# Patient Record
Sex: FEMALE | Race: WHITE | Employment: OTHER | URBAN - NONMETROPOLITAN AREA
[De-identification: names, ages, dates, MRNs, and addresses within clinical notes are randomized per-mention and may not be internally consistent; named-entity substitution may affect disease eponyms.]

---

## 2021-01-01 ENCOUNTER — APPOINTMENT (OUTPATIENT)
Dept: GENERAL RADIOLOGY | Age: 76
DRG: 871 | End: 2021-01-01
Attending: INTERNAL MEDICINE
Payer: MEDICARE

## 2021-01-01 ENCOUNTER — HOSPITAL ENCOUNTER (INPATIENT)
Age: 76
LOS: 2 days | Discharge: HOSPICE | DRG: 871 | End: 2021-03-04
Attending: INTERNAL MEDICINE | Admitting: HOSPITALIST
Payer: MEDICARE

## 2021-01-01 ENCOUNTER — ANESTHESIA EVENT (OUTPATIENT)
Dept: ENDOSCOPY | Age: 76
DRG: 871 | End: 2021-01-01
Payer: MEDICARE

## 2021-01-01 ENCOUNTER — ANESTHESIA (OUTPATIENT)
Dept: ENDOSCOPY | Age: 76
DRG: 871 | End: 2021-01-01
Payer: MEDICARE

## 2021-01-01 VITALS
RESPIRATION RATE: 15 BRPM | OXYGEN SATURATION: 85 % | DIASTOLIC BLOOD PRESSURE: 43 MMHG | SYSTOLIC BLOOD PRESSURE: 81 MMHG

## 2021-01-01 VITALS
OXYGEN SATURATION: 89 % | SYSTOLIC BLOOD PRESSURE: 126 MMHG | HEIGHT: 60 IN | BODY MASS INDEX: 23.56 KG/M2 | RESPIRATION RATE: 20 BRPM | HEART RATE: 95 BPM | DIASTOLIC BLOOD PRESSURE: 46 MMHG | WEIGHT: 120 LBS | TEMPERATURE: 103.8 F

## 2021-01-01 LAB
ALBUMIN SERPL-MCNC: 2.8 G/DL (ref 3.5–5.2)
ALBUMIN SERPL-MCNC: 2.8 G/DL (ref 3.5–5.2)
ALBUMIN SERPL-MCNC: 3 G/DL (ref 3.5–5.2)
ALP BLD-CCNC: 112 U/L (ref 35–104)
ALP BLD-CCNC: 124 U/L (ref 35–104)
ALP BLD-CCNC: 186 U/L (ref 35–104)
ALT SERPL-CCNC: 360 U/L (ref 5–33)
ALT SERPL-CCNC: 418 U/L (ref 5–33)
ALT SERPL-CCNC: 488 U/L (ref 5–33)
ANION GAP SERPL CALCULATED.3IONS-SCNC: 14 MMOL/L (ref 7–19)
ANION GAP SERPL CALCULATED.3IONS-SCNC: 17 MMOL/L (ref 7–19)
AST SERPL-CCNC: 279 U/L (ref 5–32)
AST SERPL-CCNC: 413 U/L (ref 5–32)
AST SERPL-CCNC: 571 U/L (ref 5–32)
ATYPICAL LYMPHOCYTE RELATIVE PERCENT: 1 % (ref 0–8)
BACTERIA: ABNORMAL /HPF
BANDED NEUTROPHILS RELATIVE PERCENT: 15 % (ref 0–5)
BANDED NEUTROPHILS RELATIVE PERCENT: 20 % (ref 0–5)
BASE EXCESS ARTERIAL: -5.1 MMOL/L (ref -2–2)
BASE EXCESS ARTERIAL: -6.3 MMOL/L (ref -2–2)
BASE EXCESS VENOUS: -4 MMOL/L
BASE EXCESS VENOUS: -4 MMOL/L
BASE EXCESS VENOUS: -5 MMOL/L
BASE EXCESS VENOUS: -6 MMOL/L
BASE EXCESS VENOUS: -8 MMOL/L
BASOPHILS ABSOLUTE: 0 K/UL (ref 0–0.2)
BASOPHILS ABSOLUTE: 0 K/UL (ref 0–0.2)
BASOPHILS RELATIVE PERCENT: 0 % (ref 0–1)
BASOPHILS RELATIVE PERCENT: 0 % (ref 0–1)
BILIRUB SERPL-MCNC: 2.2 MG/DL (ref 0.2–1.2)
BILIRUB SERPL-MCNC: 2.4 MG/DL (ref 0.2–1.2)
BILIRUB SERPL-MCNC: 3 MG/DL (ref 0.2–1.2)
BILIRUBIN URINE: ABNORMAL
BLOOD, URINE: NEGATIVE
BUN BLDV-MCNC: 34 MG/DL (ref 8–23)
BUN BLDV-MCNC: 37 MG/DL (ref 8–23)
BUN BLDV-MCNC: 45 MG/DL (ref 8–23)
BUN BLDV-MCNC: 48 MG/DL (ref 8–23)
CALCIUM SERPL-MCNC: 7.3 MG/DL (ref 8.8–10.2)
CALCIUM SERPL-MCNC: 7.3 MG/DL (ref 8.8–10.2)
CALCIUM SERPL-MCNC: 8 MG/DL (ref 8.8–10.2)
CALCIUM SERPL-MCNC: 8.9 MG/DL (ref 8.8–10.2)
CARBOXYHEMOGLOBIN ARTERIAL: 0.4 % (ref 0–5)
CARBOXYHEMOGLOBIN ARTERIAL: 2.4 % (ref 0–5)
CARBOXYHEMOGLOBIN: 1 %
CARBOXYHEMOGLOBIN: 1.3 %
CARBOXYHEMOGLOBIN: 2.4 %
CHLORIDE BLD-SCNC: 101 MMOL/L (ref 98–111)
CHLORIDE BLD-SCNC: 103 MMOL/L (ref 98–111)
CHLORIDE BLD-SCNC: 97 MMOL/L (ref 98–111)
CHLORIDE BLD-SCNC: 99 MMOL/L (ref 98–111)
CLARITY: ABNORMAL
CO2: 16 MMOL/L (ref 22–29)
CO2: 19 MMOL/L (ref 22–29)
CO2: 20 MMOL/L (ref 22–29)
CO2: 22 MMOL/L (ref 22–29)
COLOR: ABNORMAL
CREAT SERPL-MCNC: 1.4 MG/DL (ref 0.5–0.9)
CREAT SERPL-MCNC: 1.8 MG/DL (ref 0.5–0.9)
CREAT SERPL-MCNC: 2.3 MG/DL (ref 0.5–0.9)
CREAT SERPL-MCNC: 2.4 MG/DL (ref 0.5–0.9)
CREATININE URINE: 86.3 MG/DL (ref 4.2–622)
EKG P AXIS: 80 DEGREES
EKG P-R INTERVAL: 178 MS
EKG Q-T INTERVAL: 368 MS
EKG QRS DURATION: 78 MS
EKG QTC CALCULATION (BAZETT): 410 MS
EKG T AXIS: 120 DEGREES
EOSINOPHIL,URINE: NORMAL
EOSINOPHILS ABSOLUTE: 0 K/UL (ref 0–0.6)
EOSINOPHILS ABSOLUTE: 0 K/UL (ref 0–0.6)
EOSINOPHILS RELATIVE PERCENT: 0 % (ref 0–5)
EOSINOPHILS RELATIVE PERCENT: 0 % (ref 0–5)
EPITHELIAL CELLS, UA: ABNORMAL /HPF
FOLATE: 17.6 NG/ML (ref 4.8–37.3)
GFR AFRICAN AMERICAN: 24
GFR AFRICAN AMERICAN: 25
GFR AFRICAN AMERICAN: 33
GFR AFRICAN AMERICAN: 44
GFR NON-AFRICAN AMERICAN: 20
GFR NON-AFRICAN AMERICAN: 21
GFR NON-AFRICAN AMERICAN: 27
GFR NON-AFRICAN AMERICAN: 36
GLUCOSE BLD-MCNC: 68 MG/DL (ref 74–109)
GLUCOSE BLD-MCNC: 73 MG/DL (ref 74–109)
GLUCOSE BLD-MCNC: 78 MG/DL (ref 74–109)
GLUCOSE BLD-MCNC: 79 MG/DL (ref 74–109)
GLUCOSE URINE: NEGATIVE MG/DL
HCO3 ARTERIAL: 19 MMOL/L (ref 22–26)
HCO3 ARTERIAL: 24.2 MMOL/L (ref 22–26)
HCO3 VENOUS: 19 MMOL/L (ref 23–29)
HCO3 VENOUS: 19 MMOL/L (ref 23–29)
HCO3 VENOUS: 22 MMOL/L (ref 23–29)
HCO3 VENOUS: 23 MMOL/L (ref 23–29)
HCO3 VENOUS: 23 MMOL/L (ref 23–29)
HCT VFR BLD CALC: 27.7 % (ref 37–47)
HCT VFR BLD CALC: 30.8 % (ref 37–47)
HCT VFR BLD CALC: 31.8 % (ref 37–47)
HEMOGLOBIN, ART, EXTENDED: 10 G/DL (ref 12–16)
HEMOGLOBIN, ART, EXTENDED: 10.1 G/DL (ref 12–16)
HEMOGLOBIN: 10 G/DL (ref 12–16)
HEMOGLOBIN: 9 G/DL (ref 12–16)
HEMOGLOBIN: 9.9 G/DL (ref 12–16)
IMMATURE GRANULOCYTES #: 0.2 K/UL
IMMATURE GRANULOCYTES #: 2 K/UL
INR BLD: 1.59 (ref 0.88–1.18)
INR BLD: 2.04 (ref 0.88–1.18)
KETONES, URINE: ABNORMAL MG/DL
LACTIC ACID: 2.2 MMOL/L (ref 0.5–1.9)
LACTIC ACID: 2.2 MMOL/L (ref 0.5–1.9)
LACTIC ACID: 3.5 MMOL/L (ref 0.5–1.9)
LEUKOCYTE ESTERASE, URINE: ABNORMAL
LIPASE: 255 U/L (ref 13–60)
LYMPHOCYTES ABSOLUTE: 0.5 K/UL (ref 1.1–4.5)
LYMPHOCYTES ABSOLUTE: 0.7 K/UL (ref 1.1–4.5)
LYMPHOCYTES RELATIVE PERCENT: 4 % (ref 20–40)
LYMPHOCYTES RELATIVE PERCENT: 5 % (ref 20–40)
MACROCYTES: ABNORMAL
MAGNESIUM: 1.7 MG/DL (ref 1.6–2.4)
MCH RBC QN AUTO: 33.6 PG (ref 27–31)
MCH RBC QN AUTO: 34.1 PG (ref 27–31)
MCH RBC QN AUTO: 34.2 PG (ref 27–31)
MCHC RBC AUTO-ENTMCNC: 31.1 G/DL (ref 33–37)
MCHC RBC AUTO-ENTMCNC: 32.5 G/DL (ref 33–37)
MCHC RBC AUTO-ENTMCNC: 32.5 G/DL (ref 33–37)
MCV RBC AUTO: 105.1 FL (ref 81–99)
MCV RBC AUTO: 105.3 FL (ref 81–99)
MCV RBC AUTO: 107.8 FL (ref 81–99)
METAMYELOCYTES RELATIVE PERCENT: 6 %
METHEMOGLOBIN ARTERIAL: 0.6 %
METHEMOGLOBIN ARTERIAL: 0.8 %
METHEMOGLOBIN VENOUS: 0.8 %
METHEMOGLOBIN VENOUS: 0.8 %
MONOCYTES ABSOLUTE: 0 K/UL (ref 0–0.9)
MONOCYTES ABSOLUTE: 0.1 K/UL (ref 0–0.9)
MONOCYTES RELATIVE PERCENT: 0 % (ref 0–10)
MONOCYTES RELATIVE PERCENT: 1 % (ref 0–10)
MYELOCYTE PERCENT: 7 %
NEUTROPHILS ABSOLUTE: 10.4 K/UL (ref 1.5–7.5)
NEUTROPHILS ABSOLUTE: 12.9 K/UL (ref 1.5–7.5)
NEUTROPHILS RELATIVE PERCENT: 66 % (ref 50–65)
NEUTROPHILS RELATIVE PERCENT: 75 % (ref 50–65)
NITRITE, URINE: NEGATIVE
O2 CONTENT ARTERIAL: 12.9 ML/DL
O2 CONTENT ARTERIAL: 14.2 ML/DL
O2 CONTENT, VEN: 10 ML/DL
O2 CONTENT, VEN: 13 ML/DL
O2 CONTENT, VEN: 4 ML/DL
O2 CONTENT, VEN: 8 ML/DL
O2 CONTENT, VEN: 8 ML/DL
O2 SAT, ARTERIAL: 91.5 %
O2 SAT, ARTERIAL: 96.1 %
O2 SAT, VEN: 24 %
O2 SAT, VEN: 64 %
O2 SAT, VEN: 65 %
O2 SAT, VEN: 75 %
O2 SAT, VEN: 92 %
O2 THERAPY: ABNORMAL
O2 THERAPY: ABNORMAL
PARATHYROID HORMONE INTACT: 262.4 PG/ML (ref 15–65)
PCO2 ARTERIAL: 36 MMHG (ref 35–45)
PCO2 ARTERIAL: 68 MMHG (ref 35–45)
PCO2, VEN: 44 MMHG (ref 40–50)
PCO2, VEN: 45 MMHG (ref 40–50)
PCO2, VEN: 45 MMHG (ref 40–50)
PCO2, VEN: 52 MMHG (ref 40–50)
PDW BLD-RTO: 14.2 % (ref 11.5–14.5)
PDW BLD-RTO: 14.5 % (ref 11.5–14.5)
PDW BLD-RTO: 14.6 % (ref 11.5–14.5)
PH ARTERIAL: 7.16 (ref 7.35–7.45)
PH ARTERIAL: 7.33 (ref 7.35–7.45)
PH UA: 6.5 (ref 5–8)
PH VENOUS: 7.24 (ref 7.35–7.45)
PH VENOUS: 7.26 (ref 7.35–7.45)
PH VENOUS: 7.3 (ref 7.35–7.45)
PH VENOUS: 7.31 (ref 7.35–7.45)
PHOSPHORUS: 7.4 MG/DL (ref 2.5–4.5)
PLATELET # BLD: 117 K/UL (ref 130–400)
PLATELET # BLD: 92 K/UL (ref 130–400)
PLATELET # BLD: 93 K/UL (ref 130–400)
PLATELET SLIDE REVIEW: ABNORMAL
PLATELET SLIDE REVIEW: ABNORMAL
PMV BLD AUTO: 10.3 FL (ref 9.4–12.3)
PMV BLD AUTO: 11 FL (ref 9.4–12.3)
PMV BLD AUTO: 9.6 FL (ref 9.4–12.3)
PO2 ARTERIAL: 241 MMHG (ref 80–100)
PO2 ARTERIAL: 72 MMHG (ref 80–100)
PO2, VEN: 19 MMHG
PO2, VEN: 36 MMHG
PO2, VEN: 38 MMHG
PO2, VEN: 43 MMHG
PO2, VEN: 72 MMHG
POTASSIUM REFLEX MAGNESIUM: 3.7 MMOL/L (ref 3.5–5)
POTASSIUM REFLEX MAGNESIUM: 3.8 MMOL/L (ref 3.5–5)
POTASSIUM REFLEX MAGNESIUM: 4.9 MMOL/L (ref 3.5–5)
POTASSIUM SERPL-SCNC: 4.5 MMOL/L (ref 3.5–5)
POTASSIUM, WHOLE BLOOD: 3.5
POTASSIUM, WHOLE BLOOD: 4.3
PRO-BNP: ABNORMAL PG/ML (ref 0–1800)
PROTEIN PROTEIN: 132 MG/DL (ref 15–45)
PROTEIN UA: 100 MG/DL
PROTHROMBIN TIME: 19.1 SEC (ref 12–14.6)
PROTHROMBIN TIME: 23.4 SEC (ref 12–14.6)
RBC # BLD: 2.63 M/UL (ref 4.2–5.4)
RBC # BLD: 2.93 M/UL (ref 4.2–5.4)
RBC # BLD: 2.95 M/UL (ref 4.2–5.4)
RBC UA: ABNORMAL /HPF (ref 0–2)
SARS-COV-2, NAAT: NOT DETECTED
SODIUM BLD-SCNC: 130 MMOL/L (ref 136–145)
SODIUM BLD-SCNC: 133 MMOL/L (ref 136–145)
SODIUM BLD-SCNC: 136 MMOL/L (ref 136–145)
SODIUM BLD-SCNC: 137 MMOL/L (ref 136–145)
SODIUM URINE: 59 MMOL/L
SPECIFIC GRAVITY UA: 1.02 (ref 1–1.03)
TOTAL PROTEIN: 4.8 G/DL (ref 6.6–8.7)
TOTAL PROTEIN: 5 G/DL (ref 6.6–8.7)
TOTAL PROTEIN: 5.2 G/DL (ref 6.6–8.7)
TROPONIN: 0.42 NG/ML (ref 0–0.03)
TROPONIN: 0.8 NG/ML (ref 0–0.03)
UREA NITROGEN, UR: 584 MG/DL
UROBILINOGEN, URINE: 4 E.U./DL
VACUOLATED NEUTROPHILS: ABNORMAL
VANCOMYCIN RANDOM: 10 UG/ML
VITAMIN B-12: 1815 PG/ML (ref 211–946)
VITAMIN D 25-HYDROXY: 28.2 NG/ML
WBC # BLD: 10.9 K/UL (ref 4.8–10.8)
WBC # BLD: 13.7 K/UL (ref 4.8–10.8)
WBC # BLD: 6.4 K/UL (ref 4.8–10.8)
WBC UA: ABNORMAL /HPF (ref 0–5)

## 2021-01-01 PROCEDURE — 2580000003 HC RX 258: Performed by: SURGERY

## 2021-01-01 PROCEDURE — 92610 EVALUATE SWALLOWING FUNCTION: CPT

## 2021-01-01 PROCEDURE — 85025 COMPLETE CBC W/AUTO DIFF WBC: CPT

## 2021-01-01 PROCEDURE — 2580000003 HC RX 258: Performed by: INTERNAL MEDICINE

## 2021-01-01 PROCEDURE — 6370000000 HC RX 637 (ALT 250 FOR IP): Performed by: PHYSICIAN ASSISTANT

## 2021-01-01 PROCEDURE — 2580000003 HC RX 258: Performed by: NURSE ANESTHETIST, CERTIFIED REGISTERED

## 2021-01-01 PROCEDURE — 84132 ASSAY OF SERUM POTASSIUM: CPT

## 2021-01-01 PROCEDURE — 84100 ASSAY OF PHOSPHORUS: CPT

## 2021-01-01 PROCEDURE — 36592 COLLECT BLOOD FROM PICC: CPT

## 2021-01-01 PROCEDURE — 99223 1ST HOSP IP/OBS HIGH 75: CPT | Performed by: INTERNAL MEDICINE

## 2021-01-01 PROCEDURE — 43260 ERCP W/SPECIMEN COLLECTION: CPT | Performed by: INTERNAL MEDICINE

## 2021-01-01 PROCEDURE — 80202 ASSAY OF VANCOMYCIN: CPT

## 2021-01-01 PROCEDURE — 80053 COMPREHEN METABOLIC PANEL: CPT

## 2021-01-01 PROCEDURE — 6360000002 HC RX W HCPCS: Performed by: NURSE ANESTHETIST, CERTIFIED REGISTERED

## 2021-01-01 PROCEDURE — 71045 X-RAY EXAM CHEST 1 VIEW: CPT

## 2021-01-01 PROCEDURE — 84484 ASSAY OF TROPONIN QUANT: CPT

## 2021-01-01 PROCEDURE — 0FJB8ZZ INSPECTION OF HEPATOBILIARY DUCT, VIA NATURAL OR ARTIFICIAL OPENING ENDOSCOPIC: ICD-10-PCS | Performed by: INTERNAL MEDICINE

## 2021-01-01 PROCEDURE — 87040 BLOOD CULTURE FOR BACTERIA: CPT

## 2021-01-01 PROCEDURE — 94002 VENT MGMT INPAT INIT DAY: CPT

## 2021-01-01 PROCEDURE — 6370000000 HC RX 637 (ALT 250 FOR IP): Performed by: HOSPITALIST

## 2021-01-01 PROCEDURE — 6370000000 HC RX 637 (ALT 250 FOR IP): Performed by: INTERNAL MEDICINE

## 2021-01-01 PROCEDURE — 36415 COLL VENOUS BLD VENIPUNCTURE: CPT

## 2021-01-01 PROCEDURE — 84540 ASSAY OF URINE/UREA-N: CPT

## 2021-01-01 PROCEDURE — 83605 ASSAY OF LACTIC ACID: CPT

## 2021-01-01 PROCEDURE — 83970 ASSAY OF PARATHORMONE: CPT

## 2021-01-01 PROCEDURE — 92522 EVALUATE SPEECH PRODUCTION: CPT

## 2021-01-01 PROCEDURE — 36600 WITHDRAWAL OF ARTERIAL BLOOD: CPT

## 2021-01-01 PROCEDURE — 83880 ASSAY OF NATRIURETIC PEPTIDE: CPT

## 2021-01-01 PROCEDURE — 87186 SC STD MICRODIL/AGAR DIL: CPT

## 2021-01-01 PROCEDURE — C1769 GUIDE WIRE: HCPCS | Performed by: INTERNAL MEDICINE

## 2021-01-01 PROCEDURE — 87205 SMEAR GRAM STAIN: CPT

## 2021-01-01 PROCEDURE — 2500000003 HC RX 250 WO HCPCS: Performed by: HOSPITALIST

## 2021-01-01 PROCEDURE — 85610 PROTHROMBIN TIME: CPT

## 2021-01-01 PROCEDURE — 36556 INSERT NON-TUNNEL CV CATH: CPT

## 2021-01-01 PROCEDURE — 3609018800 HC ERCP DX COLLECTION SPECIMEN BRUSHING/WASHING: Performed by: INTERNAL MEDICINE

## 2021-01-01 PROCEDURE — 87635 SARS-COV-2 COVID-19 AMP PRB: CPT

## 2021-01-01 PROCEDURE — 2580000003 HC RX 258: Performed by: PHYSICIAN ASSISTANT

## 2021-01-01 PROCEDURE — 82607 VITAMIN B-12: CPT

## 2021-01-01 PROCEDURE — 37799 UNLISTED PX VASCULAR SURGERY: CPT

## 2021-01-01 PROCEDURE — 6360000002 HC RX W HCPCS: Performed by: PHYSICIAN ASSISTANT

## 2021-01-01 PROCEDURE — 2709999900 HC NON-CHARGEABLE SUPPLY: Performed by: INTERNAL MEDICINE

## 2021-01-01 PROCEDURE — 84156 ASSAY OF PROTEIN URINE: CPT

## 2021-01-01 PROCEDURE — 80048 BASIC METABOLIC PNL TOTAL CA: CPT

## 2021-01-01 PROCEDURE — 3700000000 HC ANESTHESIA ATTENDED CARE: Performed by: INTERNAL MEDICINE

## 2021-01-01 PROCEDURE — 82746 ASSAY OF FOLIC ACID SERUM: CPT

## 2021-01-01 PROCEDURE — 2100000000 HC CCU R&B

## 2021-01-01 PROCEDURE — 81001 URINALYSIS AUTO W/SCOPE: CPT

## 2021-01-01 PROCEDURE — 3700000001 HC ADD 15 MINUTES (ANESTHESIA): Performed by: INTERNAL MEDICINE

## 2021-01-01 PROCEDURE — 83735 ASSAY OF MAGNESIUM: CPT

## 2021-01-01 PROCEDURE — BF141ZZ FLUOROSCOPY OF GALLBLADDER, BILE DUCTS AND PANCREATIC DUCTS USING LOW OSMOLAR CONTRAST: ICD-10-PCS | Performed by: INTERNAL MEDICINE

## 2021-01-01 PROCEDURE — 05HM33Z INSERTION OF INFUSION DEVICE INTO RIGHT INTERNAL JUGULAR VEIN, PERCUTANEOUS APPROACH: ICD-10-PCS | Performed by: HOSPITALIST

## 2021-01-01 PROCEDURE — 93005 ELECTROCARDIOGRAM TRACING: CPT | Performed by: PHYSICIAN ASSISTANT

## 2021-01-01 PROCEDURE — 2500000003 HC RX 250 WO HCPCS: Performed by: NURSE ANESTHETIST, CERTIFIED REGISTERED

## 2021-01-01 PROCEDURE — 84300 ASSAY OF URINE SODIUM: CPT

## 2021-01-01 PROCEDURE — 2500000003 HC RX 250 WO HCPCS: Performed by: INTERNAL MEDICINE

## 2021-01-01 PROCEDURE — 82803 BLOOD GASES ANY COMBINATION: CPT

## 2021-01-01 PROCEDURE — 82570 ASSAY OF URINE CREATININE: CPT

## 2021-01-01 PROCEDURE — 93010 ELECTROCARDIOGRAM REPORT: CPT | Performed by: INTERNAL MEDICINE

## 2021-01-01 PROCEDURE — 99221 1ST HOSP IP/OBS SF/LOW 40: CPT | Performed by: SURGERY

## 2021-01-01 PROCEDURE — 85027 COMPLETE CBC AUTOMATED: CPT

## 2021-01-01 PROCEDURE — 2500000003 HC RX 250 WO HCPCS: Performed by: SURGERY

## 2021-01-01 PROCEDURE — 83690 ASSAY OF LIPASE: CPT

## 2021-01-01 PROCEDURE — 2580000003 HC RX 258: Performed by: HOSPITALIST

## 2021-01-01 PROCEDURE — 2700000000 HC OXYGEN THERAPY PER DAY

## 2021-01-01 PROCEDURE — 3209999900 FLUORO FOR SURGICAL PROCEDURES

## 2021-01-01 PROCEDURE — 82306 VITAMIN D 25 HYDROXY: CPT

## 2021-01-01 PROCEDURE — 6360000002 HC RX W HCPCS: Performed by: INTERNAL MEDICINE

## 2021-01-01 RX ORDER — SODIUM CHLORIDE 0.9 % (FLUSH) 0.9 %
10 SYRINGE (ML) INJECTION EVERY 12 HOURS SCHEDULED
Status: DISCONTINUED | OUTPATIENT
Start: 2021-01-01 | End: 2021-01-01

## 2021-01-01 RX ORDER — HYDROMORPHONE HYDROCHLORIDE 1 MG/ML
0.5 INJECTION, SOLUTION INTRAMUSCULAR; INTRAVENOUS; SUBCUTANEOUS EVERY 5 MIN PRN
Status: DISCONTINUED | OUTPATIENT
Start: 2021-01-01 | End: 2021-01-01

## 2021-01-01 RX ORDER — POTASSIUM CHLORIDE 7.45 MG/ML
10 INJECTION INTRAVENOUS PRN
Status: DISCONTINUED | OUTPATIENT
Start: 2021-01-01 | End: 2021-01-01 | Stop reason: HOSPADM

## 2021-01-01 RX ORDER — SODIUM CHLORIDE 9 MG/ML
INJECTION, SOLUTION INTRAVENOUS CONTINUOUS
Status: DISCONTINUED | OUTPATIENT
Start: 2021-01-01 | End: 2021-01-01 | Stop reason: HOSPADM

## 2021-01-01 RX ORDER — SODIUM CHLORIDE 0.9 % (FLUSH) 0.9 %
10 SYRINGE (ML) INJECTION EVERY 12 HOURS SCHEDULED
Status: DISCONTINUED | OUTPATIENT
Start: 2021-01-01 | End: 2021-01-01 | Stop reason: HOSPADM

## 2021-01-01 RX ORDER — FENTANYL CITRATE 50 UG/ML
50 INJECTION, SOLUTION INTRAMUSCULAR; INTRAVENOUS EVERY 5 MIN PRN
Status: DISCONTINUED | OUTPATIENT
Start: 2021-01-01 | End: 2021-01-01

## 2021-01-01 RX ORDER — LIDOCAINE HYDROCHLORIDE 20 MG/ML
INJECTION, SOLUTION INFILTRATION; PERINEURAL PRN
Status: DISCONTINUED | OUTPATIENT
Start: 2021-01-01 | End: 2021-03-10 | Stop reason: SDUPTHER

## 2021-01-01 RX ORDER — ONDANSETRON 2 MG/ML
4 INJECTION INTRAMUSCULAR; INTRAVENOUS
Status: DISCONTINUED | OUTPATIENT
Start: 2021-01-01 | End: 2021-01-01

## 2021-01-01 RX ORDER — SCOLOPAMINE TRANSDERMAL SYSTEM 1 MG/1
1 PATCH, EXTENDED RELEASE TRANSDERMAL
Status: DISCONTINUED | OUTPATIENT
Start: 2021-01-01 | End: 2021-01-01 | Stop reason: HOSPADM

## 2021-01-01 RX ORDER — HYDROMORPHONE HYDROCHLORIDE 1 MG/ML
0.25 INJECTION, SOLUTION INTRAMUSCULAR; INTRAVENOUS; SUBCUTANEOUS EVERY 5 MIN PRN
Status: DISCONTINUED | OUTPATIENT
Start: 2021-01-01 | End: 2021-01-01

## 2021-01-01 RX ORDER — SODIUM CHLORIDE 0.9 % (FLUSH) 0.9 %
10 SYRINGE (ML) INJECTION PRN
Status: DISCONTINUED | OUTPATIENT
Start: 2021-01-01 | End: 2021-01-01

## 2021-01-01 RX ORDER — ACETAMINOPHEN 650 MG/1
650 SUPPOSITORY RECTAL EVERY 6 HOURS PRN
Status: DISCONTINUED | OUTPATIENT
Start: 2021-01-01 | End: 2021-01-01 | Stop reason: HOSPADM

## 2021-01-01 RX ORDER — POTASSIUM CHLORIDE 20 MEQ/1
40 TABLET, EXTENDED RELEASE ORAL PRN
Status: DISCONTINUED | OUTPATIENT
Start: 2021-01-01 | End: 2021-01-01 | Stop reason: HOSPADM

## 2021-01-01 RX ORDER — ISOSORBIDE MONONITRATE 30 MG/1
30 TABLET, EXTENDED RELEASE ORAL DAILY
COMMUNITY

## 2021-01-01 RX ORDER — SODIUM CHLORIDE, SODIUM LACTATE, POTASSIUM CHLORIDE, CALCIUM CHLORIDE 600; 310; 30; 20 MG/100ML; MG/100ML; MG/100ML; MG/100ML
INJECTION, SOLUTION INTRAVENOUS CONTINUOUS
Status: DISCONTINUED | OUTPATIENT
Start: 2021-01-01 | End: 2021-01-01

## 2021-01-01 RX ORDER — SODIUM CHLORIDE, SODIUM LACTATE, POTASSIUM CHLORIDE, CALCIUM CHLORIDE 600; 310; 30; 20 MG/100ML; MG/100ML; MG/100ML; MG/100ML
INJECTION, SOLUTION INTRAVENOUS CONTINUOUS
Status: DISCONTINUED | OUTPATIENT
Start: 2021-01-01 | End: 2021-01-01 | Stop reason: HOSPADM

## 2021-01-01 RX ORDER — MAGNESIUM SULFATE IN WATER 40 MG/ML
2000 INJECTION, SOLUTION INTRAVENOUS PRN
Status: DISCONTINUED | OUTPATIENT
Start: 2021-01-01 | End: 2021-01-01 | Stop reason: HOSPADM

## 2021-01-01 RX ORDER — CALCIUM CHLORIDE 100 MG/ML
INJECTION INTRAVENOUS; INTRAVENTRICULAR PRN
Status: DISCONTINUED | OUTPATIENT
Start: 2021-01-01 | End: 2021-03-10 | Stop reason: SDUPTHER

## 2021-01-01 RX ORDER — CETIRIZINE HYDROCHLORIDE 10 MG/1
10 TABLET ORAL DAILY
COMMUNITY

## 2021-01-01 RX ORDER — NICOTINE 21 MG/24HR
1 PATCH, TRANSDERMAL 24 HOURS TRANSDERMAL DAILY
Status: DISCONTINUED | OUTPATIENT
Start: 2021-01-01 | End: 2021-01-01 | Stop reason: HOSPADM

## 2021-01-01 RX ORDER — FUROSEMIDE 20 MG/1
20 TABLET ORAL 2 TIMES DAILY
COMMUNITY

## 2021-01-01 RX ORDER — CARVEDILOL 6.25 MG/1
6.25 TABLET ORAL 2 TIMES DAILY WITH MEALS
COMMUNITY

## 2021-01-01 RX ORDER — ATORVASTATIN CALCIUM 20 MG/1
20 TABLET, FILM COATED ORAL DAILY
COMMUNITY

## 2021-01-01 RX ORDER — OLMESARTAN MEDOXOMIL 20 MG/1
20 TABLET ORAL DAILY
COMMUNITY

## 2021-01-01 RX ORDER — SODIUM CHLORIDE 0.9 % (FLUSH) 0.9 %
10 SYRINGE (ML) INJECTION PRN
Status: DISCONTINUED | OUTPATIENT
Start: 2021-01-01 | End: 2021-01-01 | Stop reason: HOSPADM

## 2021-01-01 RX ORDER — CLOPIDOGREL BISULFATE 75 MG/1
75 TABLET ORAL DAILY
Status: DISCONTINUED | OUTPATIENT
Start: 2021-01-01 | End: 2021-01-01 | Stop reason: HOSPADM

## 2021-01-01 RX ORDER — METOPROLOL TARTRATE 100 MG/1
100 TABLET ORAL 2 TIMES DAILY
COMMUNITY

## 2021-01-01 RX ORDER — NOREPINEPHRINE BIT/0.9 % NACL 16MG/250ML
2-100 INFUSION BOTTLE (ML) INTRAVENOUS CONTINUOUS
Status: DISCONTINUED | OUTPATIENT
Start: 2021-01-01 | End: 2021-01-01 | Stop reason: HOSPADM

## 2021-01-01 RX ORDER — SUCCINYLCHOLINE CHLORIDE 20 MG/ML
INJECTION INTRAMUSCULAR; INTRAVENOUS PRN
Status: DISCONTINUED | OUTPATIENT
Start: 2021-01-01 | End: 2021-03-10 | Stop reason: SDUPTHER

## 2021-01-01 RX ORDER — ACETAMINOPHEN 325 MG/1
650 TABLET ORAL EVERY 6 HOURS PRN
Status: DISCONTINUED | OUTPATIENT
Start: 2021-01-01 | End: 2021-01-01 | Stop reason: HOSPADM

## 2021-01-01 RX ORDER — PROMETHAZINE HYDROCHLORIDE 12.5 MG/1
12.5 TABLET ORAL EVERY 6 HOURS PRN
Status: DISCONTINUED | OUTPATIENT
Start: 2021-01-01 | End: 2021-01-01 | Stop reason: HOSPADM

## 2021-01-01 RX ORDER — SODIUM CHLORIDE 9 MG/ML
INJECTION, SOLUTION INTRAVENOUS CONTINUOUS
Status: DISCONTINUED | OUTPATIENT
Start: 2021-01-01 | End: 2021-01-01

## 2021-01-01 RX ORDER — ATROPINE SULFATE 10 MG/ML
3 SOLUTION/ DROPS OPHTHALMIC EVERY 4 HOURS PRN
Status: DISCONTINUED | OUTPATIENT
Start: 2021-01-01 | End: 2021-01-01 | Stop reason: HOSPADM

## 2021-01-01 RX ORDER — OMEPRAZOLE 20 MG/1
20 CAPSULE, DELAYED RELEASE ORAL DAILY
COMMUNITY

## 2021-01-01 RX ORDER — SODIUM CHLORIDE, SODIUM LACTATE, POTASSIUM CHLORIDE, CALCIUM CHLORIDE 600; 310; 30; 20 MG/100ML; MG/100ML; MG/100ML; MG/100ML
INJECTION, SOLUTION INTRAVENOUS CONTINUOUS PRN
Status: DISCONTINUED | OUTPATIENT
Start: 2021-01-01 | End: 2021-03-10 | Stop reason: SDUPTHER

## 2021-01-01 RX ORDER — ONDANSETRON 2 MG/ML
4 INJECTION INTRAMUSCULAR; INTRAVENOUS EVERY 6 HOURS PRN
Status: DISCONTINUED | OUTPATIENT
Start: 2021-01-01 | End: 2021-01-01 | Stop reason: HOSPADM

## 2021-01-01 RX ORDER — PROPOFOL 10 MG/ML
INJECTION, EMULSION INTRAVENOUS PRN
Status: DISCONTINUED | OUTPATIENT
Start: 2021-01-01 | End: 2021-03-10 | Stop reason: SDUPTHER

## 2021-01-01 RX ORDER — SENNA PLUS 8.6 MG/1
1 TABLET ORAL DAILY
Status: DISCONTINUED | OUTPATIENT
Start: 2021-01-01 | End: 2021-01-01 | Stop reason: HOSPADM

## 2021-01-01 RX ORDER — ROCURONIUM BROMIDE 10 MG/ML
INJECTION, SOLUTION INTRAVENOUS PRN
Status: DISCONTINUED | OUTPATIENT
Start: 2021-01-01 | End: 2021-03-10 | Stop reason: SDUPTHER

## 2021-01-01 RX ORDER — POLYETHYLENE GLYCOL 3350 17 G/17G
17 POWDER, FOR SOLUTION ORAL DAILY PRN
Status: DISCONTINUED | OUTPATIENT
Start: 2021-01-01 | End: 2021-01-01 | Stop reason: HOSPADM

## 2021-01-01 RX ADMIN — PROPOFOL 80 MG: 10 INJECTION, EMULSION INTRAVENOUS at 16:02

## 2021-01-01 RX ADMIN — Medication 50 MCG/MIN: at 15:50

## 2021-01-01 RX ADMIN — Medication 25 MG/HR: at 06:37

## 2021-01-01 RX ADMIN — PHENYLEPHRINE HYDROCHLORIDE 100 MCG: 10 INJECTION INTRAVENOUS at 16:17

## 2021-01-01 RX ADMIN — SODIUM BICARBONATE: 84 INJECTION INTRAVENOUS at 04:57

## 2021-01-01 RX ADMIN — ROCURONIUM BROMIDE 30 MG: 10 INJECTION, SOLUTION INTRAVENOUS at 16:41

## 2021-01-01 RX ADMIN — VASOPRESSIN 0.03 UNITS/MIN: 20 INJECTION INTRAVENOUS at 20:19

## 2021-01-01 RX ADMIN — LIDOCAINE HYDROCHLORIDE 40 MG: 20 INJECTION, SOLUTION INFILTRATION; PERINEURAL at 16:02

## 2021-01-01 RX ADMIN — SODIUM BICARBONATE 50 MEQ: 84 INJECTION, SOLUTION INTRAVENOUS at 05:04

## 2021-01-01 RX ADMIN — Medication 10 MCG/MIN: at 20:30

## 2021-01-01 RX ADMIN — PHENYLEPHRINE HYDROCHLORIDE 100 MCG: 10 INJECTION INTRAVENOUS at 16:11

## 2021-01-01 RX ADMIN — SODIUM CHLORIDE: 9 INJECTION, SOLUTION INTRAVENOUS at 20:59

## 2021-01-01 RX ADMIN — SODIUM CHLORIDE: 9 INJECTION, SOLUTION INTRAVENOUS at 20:09

## 2021-01-01 RX ADMIN — MEROPENEM 1000 MG: 1 INJECTION, POWDER, FOR SOLUTION INTRAVENOUS at 20:14

## 2021-01-01 RX ADMIN — Medication 1000 MG: at 22:54

## 2021-01-01 RX ADMIN — MEROPENEM 1000 MG: 1 INJECTION, POWDER, FOR SOLUTION INTRAVENOUS at 08:23

## 2021-01-01 RX ADMIN — SUCCINYLCHOLINE CHLORIDE 80 MG: 20 INJECTION, SOLUTION INTRAMUSCULAR; INTRAVENOUS at 16:03

## 2021-01-01 RX ADMIN — PHENYLEPHRINE HYDROCHLORIDE 500 MCG: 10 INJECTION INTRAVENOUS at 16:18

## 2021-01-01 RX ADMIN — MEROPENEM 1000 MG: 1 INJECTION, POWDER, FOR SOLUTION INTRAVENOUS at 21:12

## 2021-01-01 RX ADMIN — Medication 20 MCG/MIN: at 00:32

## 2021-01-01 RX ADMIN — SODIUM CHLORIDE, PRESERVATIVE FREE 10 ML: 5 INJECTION INTRAVENOUS at 20:11

## 2021-01-01 RX ADMIN — ACETAMINOPHEN 650 MG: 650 SUPPOSITORY RECTAL at 06:03

## 2021-01-01 RX ADMIN — Medication 48 MCG/MIN: at 10:10

## 2021-01-01 RX ADMIN — VASOPRESSIN 0.03 UNITS/MIN: 20 INJECTION INTRAVENOUS at 06:37

## 2021-01-01 RX ADMIN — ENOXAPARIN SODIUM 30 MG: 30 INJECTION SUBCUTANEOUS at 23:11

## 2021-01-01 RX ADMIN — SODIUM CHLORIDE, SODIUM LACTATE, POTASSIUM CHLORIDE, AND CALCIUM CHLORIDE: 600; 310; 30; 20 INJECTION, SOLUTION INTRAVENOUS at 15:50

## 2021-01-01 RX ADMIN — Medication 50 MCG/MIN: at 04:26

## 2021-01-01 RX ADMIN — CALCIUM CHLORIDE 1 G: 100 INJECTION, SOLUTION INTRAVENOUS at 16:11

## 2021-01-01 RX ADMIN — PHENYLEPHRINE HYDROCHLORIDE 100 MCG: 10 INJECTION INTRAVENOUS at 16:20

## 2021-01-01 RX ADMIN — Medication 1000 MG: at 21:12

## 2021-01-01 RX ADMIN — CLOPIDOGREL BISULFATE 75 MG: 75 TABLET ORAL at 23:11

## 2021-01-01 RX ADMIN — CLOPIDOGREL BISULFATE 75 MG: 75 TABLET ORAL at 08:23

## 2021-01-01 RX ADMIN — GLUCAGON HYDROCHLORIDE 1 MG: 1 INJECTION, POWDER, FOR SOLUTION INTRAMUSCULAR; INTRAVENOUS; SUBCUTANEOUS at 17:00

## 2021-01-01 RX ADMIN — SODIUM CHLORIDE, PRESERVATIVE FREE 10 ML: 5 INJECTION INTRAVENOUS at 23:19

## 2021-01-01 RX ADMIN — SODIUM BICARBONATE 50 MEQ: 84 INJECTION, SOLUTION INTRAVENOUS at 21:12

## 2021-01-01 RX ADMIN — PHENYLEPHRINE HYDROCHLORIDE 100 MCG: 10 INJECTION INTRAVENOUS at 16:14

## 2021-01-01 RX ADMIN — ROCURONIUM BROMIDE 15 MG: 10 INJECTION, SOLUTION INTRAVENOUS at 16:22

## 2021-01-01 RX ADMIN — STANDARDIZED SENNA CONCENTRATE 8.6 MG: 8.6 TABLET ORAL at 23:11

## 2021-01-01 RX ADMIN — ROCURONIUM BROMIDE 5 MG: 10 INJECTION, SOLUTION INTRAVENOUS at 16:02

## 2021-01-01 RX ADMIN — SODIUM CHLORIDE, PRESERVATIVE FREE 10 ML: 5 INJECTION INTRAVENOUS at 09:00

## 2021-01-01 RX ADMIN — STANDARDIZED SENNA CONCENTRATE 8.6 MG: 8.6 TABLET ORAL at 08:23

## 2021-01-01 RX ADMIN — PHENYLEPHRINE HYDROCHLORIDE 100 MCG: 10 INJECTION INTRAVENOUS at 16:08

## 2021-01-01 RX ADMIN — SODIUM CHLORIDE, POTASSIUM CHLORIDE, SODIUM LACTATE AND CALCIUM CHLORIDE: 600; 310; 30; 20 INJECTION, SOLUTION INTRAVENOUS at 12:17

## 2021-01-01 ASSESSMENT — PULMONARY FUNCTION TESTS
PIF_VALUE: 24
PIF_VALUE: 18
PIF_VALUE: 19
PIF_VALUE: 25
PIF_VALUE: 18
PIF_VALUE: 27
PIF_VALUE: 18
PIF_VALUE: 18
PIF_VALUE: 20
PIF_VALUE: 18
PIF_VALUE: 18
PIF_VALUE: 22
PIF_VALUE: 18
PIF_VALUE: 22
PIF_VALUE: 18
PIF_VALUE: 19
PIF_VALUE: 19

## 2021-01-01 ASSESSMENT — PAIN DESCRIPTION - ONSET: ONSET: ON-GOING

## 2021-01-01 ASSESSMENT — PAIN SCALES - GENERAL
PAINLEVEL_OUTOF10: 0
PAINLEVEL_OUTOF10: 1
PAINLEVEL_OUTOF10: 0
PAINLEVEL_OUTOF10: 3

## 2021-01-01 ASSESSMENT — PAIN DESCRIPTION - PAIN TYPE
TYPE: OTHER (COMMENT)
TYPE: ACUTE PAIN

## 2021-01-01 ASSESSMENT — PAIN DESCRIPTION - LOCATION
LOCATION: BACK;SHOULDER
LOCATION: FOOT

## 2021-01-01 ASSESSMENT — PAIN DESCRIPTION - ORIENTATION: ORIENTATION: MID

## 2021-01-01 ASSESSMENT — LIFESTYLE VARIABLES: SMOKING_STATUS: 1

## 2021-01-01 ASSESSMENT — PAIN DESCRIPTION - FREQUENCY: FREQUENCY: CONTINUOUS

## 2021-01-01 ASSESSMENT — PAIN DESCRIPTION - PROGRESSION: CLINICAL_PROGRESSION: NOT CHANGED

## 2021-01-01 ASSESSMENT — PAIN DESCRIPTION - DESCRIPTORS: DESCRIPTORS: BURNING

## 2021-03-02 PROBLEM — J44.9 COPD (CHRONIC OBSTRUCTIVE PULMONARY DISEASE) (HCC): Status: ACTIVE | Noted: 2021-01-01

## 2021-03-02 PROBLEM — N17.9 AKI (ACUTE KIDNEY INJURY) (HCC): Status: ACTIVE | Noted: 2021-01-01

## 2021-03-02 PROBLEM — K80.50 CHOLEDOCHOLITHIASIS: Status: ACTIVE | Noted: 2021-01-01

## 2021-03-02 PROBLEM — R79.89 ELEVATED BRAIN NATRIURETIC PEPTIDE (BNP) LEVEL: Status: ACTIVE | Noted: 2021-01-01

## 2021-03-02 PROBLEM — Z72.0 TOBACCO ABUSE: Status: ACTIVE | Noted: 2021-01-01

## 2021-03-02 PROBLEM — E87.20 LACTIC ACIDOSIS: Status: ACTIVE | Noted: 2021-01-01

## 2021-03-02 PROBLEM — E87.1 HYPONATREMIA: Status: ACTIVE | Noted: 2021-01-01

## 2021-03-02 PROBLEM — I25.10 CORONARY ARTERY DISEASE INVOLVING NATIVE CORONARY ARTERY OF NATIVE HEART: Status: ACTIVE | Noted: 2021-01-01

## 2021-03-03 PROBLEM — Z51.5 PALLIATIVE CARE PATIENT: Status: ACTIVE | Noted: 2021-01-01

## 2021-03-03 NOTE — ACP (ADVANCE CARE PLANNING)
Advance Care Planning     Advance Care Planning Activator (Inpatient)  Conversation Note      Date of ACP Conversation: 3/2/2021    Conversation Conducted with: Alonso Cárdenas, patients son. Patients son pleasant, alert and oriented, able to answer questions and participate in conversation. ACP Activator: Amisha Ritter     Health Care Decision Maker:     Current Designated Health Care Decision Maker:     Primary Decision Maker: Rick Reyes Child - [phone]      Care Preferences    Ventilation: \"If you were in your present state of health and suddenly became very ill and were unable to breathe on your own, what would your preference be about the use of a ventilator (breathing machine) if it were available to you? \"      Would the patient desire the use of ventilator (breathing machine)?: YES    \"If your health worsens and it becomes clear that your chance of recovery is unlikely, what would your preference be about the use of a ventilator (breathing machine) if it were available to you? \"     Would the patient desire the use of ventilator (breathing machine)?: YES    Resuscitation  \"CPR works best to restart the heart when there is a sudden event, like a heart attack, in someone who is otherwise healthy. Unfortunately, CPR does not typically restart the heart for people who have serious health conditions or who are very sick. \"    \"In the event your heart stopped as a result of an underlying serious health condition, would you want attempts to be made to restart your heart (answer \"yes\" for attempt to resuscitate) or would you prefer a natural death (answer \"no\" for do not attempt to resuscitate)? \" YES    Conversation Outcomes:  [x] ACP discussion completed  [] Existing advance directive reviewed with patient; no changes to patient's previously recorded wishes  [] New Advance Directive completed  [] Portable Do Not Rescitate prepared for Provider review and signature  [] POLST/POST/MOLST/MOST prepared for Provider review and signature    Electronically signed by Omer Fournier RN on 3/3/2021 at 11:04 AM

## 2021-03-03 NOTE — PROGRESS NOTES
Pharmacy Note  Vancomycin Consult    Kei Zheng is a 67 y.o. female started on Vancomycin for Intra-Abdominal Infection ; consult received from Elsa Barrios to manage therapy. Also receiving the following antibiotics: Meropenem. Principal Problem:    Choledocholithiasis  Active Problems:    Acute on chronic respiratory failure (HCC)    Essential hypertension    COPD (chronic obstructive pulmonary disease) ([facility])    Coronary artery disease involving native coronary artery of native heart    Tobacco abuse    Lactic acidosis    JANEE (acute kidney injury) ([facility])    Hyponatremia    Elevated brain natriuretic peptide (BNP) level  Resolved Problems:    * No resolved hospital problems. *      Allergies:  Codeine     Temp max: 96.7    Recent Labs     03/02/21  1809   BUN 34*       Recent Labs     03/02/21  1809   CREATININE 1.4*       Recent Labs     03/02/21  1809   WBC 6.4       No intake or output data in the 24 hours ending 03/02/21 1927    Culture Date Source Results   03/02/21 Blood Sent       Ht Readings from Last 1 Encounters:   03/02/21 5' (1.524 m)        Wt Readings from Last 1 Encounters:   03/02/21 120 lb (54.4 kg)         Body mass index is 23.44 kg/m². Estimated Creatinine Clearance: 25 mL/min (A) (based on SCr of 1.4 mg/dL (H)). Assessment/Plan:  Will initiate vancomycin pulse dosing. Will give vancomycin 1000 mg IV once. Timing of trough level will be determined based on culture results, renal function, and clinical response. Thank you for the consult. Will continue to follow.     Electronically signed by Misti Jefferson [address] [place] [address] on 3/2/2021 at 7:27 PM

## 2021-03-03 NOTE — CONSULTS
Pt Name: Charlotte Arroyo  MRN: [id number]  [id number]  YOB: 1945  Admit Date: 3/2/2021  5:33 PM  Date of evaluation: 3/3/2021  Primary Care Physician: Anabelle Kim MD   [id number]/705-01       Requesting Provider:  Hospitalist Service    GI Consult    Indication:  Septic shock, elevated LFTs, abodminal pain    History:  The patient is a 67 y.o. female admitted last night for concern of choledocholithiasis and cholangitis. Patient states she has had worsening abdominal pain over the past 4+ days. She describes it as bad heartburn radiating to her back. She has had some significant nausea weakness and almost near syncopal episodes. She states she has had recurrent pain and anorexia for multiple years. She does know that she had \"gallbladder disease\" in the past.  She was seen in outside hospital and found to have significant elevation in her LFTs including a bilirubin elevation, transaminitis. She was mildly hypotensive. Ultrasound at the outside hospital showed significant debris and sludge in her gallbladder as well as biliary dilatation. Upon admission she was mildly hypotensive, she had an elevated lactate with evidence of acidosis. She was started on vasopressors for her hypotension. Night she has had some improvement in her LFTs. Her lactic acid is still elevated but slightly improved. No measured fevers. She has developed a leukocytosis overnight. Remains on pressure support with Levophed. She has grown multiple blood cultures positive. Pain:   Location:  Epigastrium, RUQ  Duration:  Episodic   Radiation:  Back   Associated:  Nausea, anorexia   Mitigating factors:  Pain meds  Exacerbating factors:  Eating    He has known chronic lung disease requiring 2 L nasal cannula of oxygen at night. She also has known coronary artery disease and uses Plavix. She has a history of peripheral vascular disease with stenting as well.   She continues on her Plavix currently    Past Medical History:        Diagnosis Date    Atherosclerosis of native arteries of the extremities with intermittent claudication 10/13/2014    Chronic respiratory failure (HCC)     Claudication (HCC)     Dysuria     Essential hypertension     History of chronic rhinitis     Hyperlipemia     Iron deficiency     Palliative care patient 03/03/2021    PVD (peripheral vascular disease) ([facility] [address]     Shoulder tendinitis     Urinary incontinence      Past Surgical History:        Procedure Laterality Date    ANGIOPLASTY Bilateral 2005 broadbent    ? iliacs ( no op note)    APPENDECTOMY  1968    COLONOSCOPY  1974    CYST REMOVAL      Tail bone region     Allergies:  Codeine  Home Meds:  Prior to Admission medications    Medication Sig Start Date End Date Taking? Authorizing Provider   omeprazole (PRILOSEC) 20 MG delayed release capsule Take 20 mg by mouth daily   Yes Historical Provider, MD   carvedilol (COREG) 6.25 MG tablet Take 6.25 mg by mouth 2 times daily (with meals)   Yes Historical Provider, MD   furosemide (LASIX) 20 MG tablet Take 20 mg by mouth 2 times daily   Yes Historical Provider, MD   isosorbide mononitrate (IMDUR) 30 MG extended release tablet Take 30 mg by mouth daily   Yes Historical Provider, MD   atorvastatin (LIPITOR) 20 MG tablet Take 20 mg by mouth daily   Yes Historical Provider, MD   metoprolol (LOPRESSOR) 100 MG tablet Take 100 mg by mouth 2 times daily   Yes Historical Provider, MD   cetirizine (ZYRTEC) 10 MG tablet Take 10 mg by mouth daily   Yes Historical Provider, MD   olmesartan (BENICAR) 20 MG tablet Take 20 mg by mouth daily   Yes Historical Provider, MD   aspirin 81 MG tablet Take 81 mg by mouth daily. Historical Provider, MD   clopidogrel (PLAVIX) 75 MG tablet Take 75 mg by mouth daily.     Historical Provider, MD        Current Meds:       nicotine  1 patch Transdermal Daily    indomethacin  100 mg Rectal Once    sodium chloride flush  10 mL Intravenous 2 times per day    senna 1 tablet Oral Daily    [Held by provider] clopidogrel  75 mg Oral Daily    meropenem (MERREM) 1000 mg in SWFI 20 mL IV syringe  1,000 mg Intravenous Q12H    enoxaparin  30 mg Subcutaneous Q24H    vancomycin (VANCOCIN) intermittent dosing (placeholder)   Other RX Placeholder        lactated ringers 125 mL/hr at 03/03/21 1217    norepinephrine-sodium chloride 48 mcg/min (03/03/21 1010)       PRN Meds:  sodium chloride flush, promethazine **OR** ondansetron, acetaminophen **OR** acetaminophen, potassium chloride **OR** potassium alternative oral replacement **OR** potassium chloride, magnesium sulfate, polyethylene glycol    Social History:   Social History     Socioeconomic History    Marital status:       Spouse name: Not on file    Number of children: Not on file    Years of education: Not on file    Highest education level: Not on file   Occupational History    Not on file   Social Needs    Financial resource strain: Not on file    Food insecurity     Worry: Not on file     Inability: Not on file    Transportation needs     Medical: Not on file     Non-medical: Not on file   Tobacco Use    Smoking status: Current Every Day Smoker     Packs/day: 1.00    Smokeless tobacco: Never Used   Substance and Sexual Activity    Alcohol use: No    Drug use: No    Sexual activity: Not on file   Lifestyle    Physical activity     Days per week: Not on file     Minutes per session: Not on file    Stress: Not on file   Relationships    Social connections     Talks on phone: Not on file     Gets together: Not on file     Attends Denominational service: Not on file     Active member of club or organization: Not on file     Attends meetings of clubs or organizations: Not on file     Relationship status: Not on file    Intimate partner violence     Fear of current or ex partner: Not on file     Emotionally abused: Not on file     Physically abused: Not on file     Forced sexual activity: Not on file   Other Topics Concern    Not on file   Social History Narrative    Not on file   00    Family History:   Family History   Problem Relation Age of Onset    Heart Disease Father     Heart Disease Brother     High Blood Pressure Mother     Other Mother         blood infection       No GI malignancies     ROS:  GENERAL: no measured fever since admission, +reported chills at home. NEURO: No headache or seizure. EYES: No diplopia or vision loss. CARDIOVASCULAR: No chest pain or palpitations. RESPIRATORY: No dyspnea or cough. GI: no melena. no hematochezia, + abdominal pain, + nausea/vomiting  :  Patient denies urinary symptoms. No hematuria, she has had intermittent darkening of her urine over last few days. GYN: No discharge or abnormal bleeding. MUSCULOSKELETAL: No new arthralgias or myalgias  DERM: No rash or jaundice. ENDOCRINE: No polyuria or polydipsia. PSYCH: No anxiety or depression. Physical Exam:  VITALS:   Vitals:    03/03/21 1000 03/03/21 1100 03/03/21 1200 03/03/21 1300   BP: (!) 123/53 (!) 128/59 (!) 110/47 (!) 128/58   Pulse: 98 96 94 98   Resp: 21 22 25 26   Temp:   97.2 °F (36.2 °C)    TempSrc:       SpO2: 96% (!) 86% 92% 95%   Weight:       Height:           General appearance: alert and cooperative with exam, appears stated age, no acute distress   Head: normal cephalic, atraumatic. EOMI bilaterally, no neck lymphadenopathy appreciated, no carotid bruits noted  Lungs: clear to auscultation bilaterally  Heart: regular rate and rhythm, S1, S2 normal, no murmur, click, rub or gallop  Abdomen: normal findings: bowel sounds normal, no bruits heard, no masses palpable, no organomegaly and umbilicus normal and abnormal findings:  tenderness moderate in the epigastrium and in the RUQ  Skin: warm, dry, no obvious rash, non-jaundice  Extremities: Bilateral upper extremities warm to touch. Her lower extremities are quite cool. .  She is able to move those normally. No pain to palpation. Dorsalis pedis pulses difficult to ascertain but present  Neurologic: No obvious focal neurologic deficits. CN II-XII grossly intact      Labs:     Recent Labs     03/02/21 1809 03/03/21  0352   WBC 6.4 10.9*   RBC 2.95* 2.63*   HGB 9.9* 9.0*   HCT 31.8* 27.7*   .8* 105.3*   MCH 33.6* 34.2*   MCHC 31.1* 32.5*   * 92*     Recent Labs     03/02/21 1809 03/02/21  1820 03/03/21  0352   *  --  136   K 3.7 3.5 3.8   ANIONGAP 17  --  14   CL 97*  --  103   CO2 16*  --  19*   BUN 34*  --  37*   CREATININE 1.4*  --  1.8*   GLUCOSE 73*  --  78   CALCIUM 8.9  --  7.3*     No results for input(s): MG, PHOS in the last 72 hours. Recent Labs     03/02/21 1809 03/03/21 0352   * 413*   * 418*   BILITOT 3.0* 2.4*   ALKPHOS 186* 124*     HgBA1c:  No components found for: HGBA1C  FLP:  No results found for: TRIG, HDL, LDLCALC, LDLDIRECT, LABVLDL  TSH:  No results found for: TSH  Troponin T: No results for input(s): TROPONINI in the last 72 hours. INR:   Recent Labs     03/02/21 2117   INR 1.59*       No results for input(s): LIPASE, AMYLASE in the last 72 hours. Radiology:    RUQ U/S reviewed from OSH noting CBD at 1.1cm and distended GB with probable sludge noted. Pancreas normal on ultrasound. Assessment:    1. Septic Shock - on Levophed gtt  2. Elevated LFTs, Abdominal pain and Sepsis - cholangitis until proven otherwise. 3. Biliary dilation - clinical concern for choledocholithiasis vs stricture  4. Metabolic acidosis from #1 above - continue gtts   5. Acute Renal Failure -  Worsening over last 24 hrs  6. Acute Hypoxia on Chronic Respiratory Disease on Home O2 - on increased O2 requirement currently  7.  Coronary Artery Disease - on Plavix       Plan:    - Emergent ERCP today - r/b/i discussed with patient including need for general anesthesia, risk of intubation post procedure as well as post ERCP pancreatitis   - Cholangitis - continue Antibiotics and IVF  - NPO  - Wean vasopressors as tolerated       I have discussed the benefits, alternatives, and risks (including bleeding, pancreatitis, perforation and death)  for pursuing Endoscopy (EGD/Colonscopy/EUS/ERCP) with the patient and they are willing to continue. We also discussed the need for anesthesia, IV access, proper dietary changes, medication changes if necessary, and need for bowel prep (if ordered) prior to their Endoscopic procedure - they agree to the above and are willing to continue.

## 2021-03-03 NOTE — BRIEF OP NOTE
Brief Postoperative Note      Patient: Sierra Rojas  YOB: 1945  MRN: [id number]    Date of Procedure: 3/3/2021    Pre-Op Diagnosis: Choledocholithiasis with cholangitis    Post-Op Diagnosis: Same       Procedure(s):  ERCP DIAGNOSTIC - aborted prior to cannulating biliary tree due to failed cannulation. Procedure aborted after discussion with Anesthesia service due to cardiac status, tachycardia, hypotension    Surgeon(s):  Mert Wyatt MD    Assistant:  * No surgical staff found *    Anesthesia: General    Estimated Blood Loss (mL): none    Complications: Hypotension    Specimens:   * No specimens in log *    Implants:  * No implants in log *      Findings:   - Very difficult to identify biliary orifice. MPD cannluated, 2-wire technique attempted. After  ~40 minutes of attempted cannulation, I had not successfully cannulated the biliary tree. The patient had continued hypotension with a second vasopressor added. She had worsening tachycardia. Much of these changes developed/worsened after her prone positioning (position for ERCP). Due to these circumstances, the procedure was aborted. Plan   - I have called and discussed the patient's status with Dr Timothy Edmonds (attending hospitalist). Unfortunately we have not accessed her biliary tree or relieved her presumptive source of infection. If her condition continues to worsen, she may require emergent biliary access/drainage percutaneously or surgically.    - Continue ABx and IVFs  - Vasopressors       Electronically signed by Mert Wyatt MD on 3/3/2021 at 5:18 PM

## 2021-03-03 NOTE — PROGRESS NOTES
The patient's feet bilaterally are ice cold and pulses not heard with doppler. Notified Dr. Ricardo Cartagena.

## 2021-03-03 NOTE — PROGRESS NOTES
Progress Note  Date:3/3/2021       Room:0705/705-01  Patient Name:Maria Lucero     YOB: 1945     Age:75 y.o. Subjective    Subjective:  58-year-old lady with a history of coronary artery disease status post stent, COPD on nocturnal oxygen 2L, presented from an outside facility with concerns of abdominal pain and noted to have choledocholithiasis requiring GI evaluation for ERCP hence transferred to [address] [place] [address]. On arrival to [address] [place] [address], it was noted that patient's blood pressure was in the 14T systolic, and minimally responsive. Outpatient records showed lactic acid of 6.7, 83% on room air requiring supplemental oxygen. Patient was transferred to the CCU for closer monitoring and further work-up of presentation.     Overnight, blood cultures noted positive x2, requiring pressors and currently trying to wean down, renal function getting worse and noted to have cold extremities. However patient is more awake and alert, and conversant. Knows she is in the hospital and was able to provide her history. States she is feeling somewhat better. Denies any chest pain, SOB, nausea or emesis. Review of Systems : 12 point system reviewed and negative except as stated above. Objective         Vitals Last 24 Hours:  TEMPERATURE:  Temp  Av.7 °F (36.5 °C)  Min: 96.7 °F (35.9 °C)  Max: 98.2 °F (36.8 °C)  RESPIRATIONS RANGE: Resp  Av.9  Min: 14  Max: 23  PULSE OXIMETRY RANGE: SpO2  Av.5 %  Min: 88 %  Max: 96 %  PULSE RANGE: Pulse  Av.8  Min: 75  Max: 110  BLOOD PRESSURE RANGE: Systolic (06NWT), VHC:25 , Min:66 , LGQ:754   ; Diastolic (07XVZ), SFO:27, Min:36, Max:59    I/O (24Hr): Intake/Output Summary (Last 24 hours) at 3/3/2021 1126  Last data filed at 3/3/2021 0800  Gross per 24 hour   Intake 1176.66 ml   Output 418 ml   Net 758.66 ml         Physical Examination:  General: cachetic, Ill appearing no acute distress lying comfortably in bed.   HEENT: Atraumatic normocephalic, range of motion normal   Cardiac: Normal R4-I2 with systolic murmur  Respiratory: adequate air entry with rhonchi  Abdomen: Soft, positive bowel sounds in all quadrants, no distention, nontender to palpation  Extremities: no tenderness, no edema, moves all extremities, LE cold to touch  Psych: Affect normal and good eye contact, behavioral normal.        Labs/Imaging/Diagnostics    Labs:  CBC:  Recent Labs     03/02/21  1809 03/03/21  0352   WBC 6.4 10.9*   RBC 2.95* 2.63*   HGB 9.9* 9.0*   HCT 31.8* 27.7*   .8* 105.3*   RDW 14.2 14.5   * 92*     CHEMISTRIES:  Recent Labs     03/02/21 1809 03/02/21  1820 03/03/21  0352   *  --  136   K 3.7 3.5 3.8   CL 97*  --  103   CO2 16*  --  19*   BUN 34*  --  37*   CREATININE 1.4*  --  1.8*   GLUCOSE 73*  --  78     PT/INR:  Recent Labs     03/02/21 2117   PROTIME 19.1*   INR 1.59*     APTT:No results for input(s): APTT in the last 72 hours. LIVER PROFILE:  Recent Labs     03/02/21  1809 03/03/21  0352   * 413*   * 418*   BILITOT 3.0* 2.4*   ALKPHOS 186* 124*       Imaging Last 24 Hours:  Xr Chest Portable    Result Date: 3/2/2021  EXAMINATION: XR CHEST PORTABLE 3/2/2021 9:08 PM HISTORY: XR CHEST PORTABLE 3/2/2021 7:12 PM HISTORY: Central venous catheter placement COMPARISON: None. FINDINGS: Chronic changes noted in the pulmonary parenchyma. Hyperinflation is present. . Cardiac silhouette is normal. Vascular calcifications present aortic arch. Right internal jugular central venous catheter is present with the distal tip satisfactorily positioned in the superior vena cava. . Cardiac monitoring leads are present. The osseous structures and surrounding soft tissues demonstrate no acute abnormality. 1. COPD with no acute cardiopulmonary process. . Signed by Dr Laurie Anaya on 3/2/2021 9:09 PM    Assessment//Plan           Hospital Problems           Last Modified POA    * (Principal) Choledocholithiasis 3/2/2021 Yes    Acute on chronic respiratory failure ([facility] [address] 3/2/2021 Yes    Essential hypertension 3/2/2021 Yes    COPD (chronic obstructive pulmonary disease) ([facility] [address] 3/2/2021 Yes    Coronary artery disease involving native coronary artery of native heart 3/2/2021 Yes    Tobacco abuse 3/2/2021 Yes    Lactic acidosis 3/2/2021 Yes    JANEE (acute kidney injury) ([facility] [address] 3/2/2021 Yes    Hyponatremia 3/2/2021 Yes    Elevated brain natriuretic peptide (BNP) level 3/2/2021 Yes    Palliative care patient 3/3/2021 Yes        Assessment & Plan      Septic shock  Bacteremia  Choledocholithiasis  Acute on chronic hypoxic respiratory failure  Lactic acidosis  JANEE on CKD versus CKD (unknown stage)  Metabolic acidosis  Transaminitis and elevated Alk phos  Thrombocytopenia  Hypoalbuminemia      Plan  Continue care in the ICU, wean down pressors if able  S/p 30 cc fluid bolus per sepsis protocol  Broad-spectrum antibiotics with vancomycin and meropenem  Follow repeat blood culture form today 3/3  Trend lactate levels. Monitoring on telemetry  S/p 100 mEq of sodium bicarb IV push; continue current fluids ordered  GI consultation  ID and Nephrology Consultation  Chest x-ray at outside facility with no concerns of consolidations. Hold BP medications as well as nephrotoxic agents. Discussed with son and patient full code.           Electronically signed by   Willard Knox   Internal Medicine Hospitalist  On 3/3/2021  At 11:55 AM    EMR Dragon/Transcription disclaimer:   Much of this encounter note is an electronic transcription/translation of spoken language to printed text.  The electronic translation of spoken language may permit erroneous, or at times, nonsensical words or phrases to be inadvertently transcribed; although attempts have made to review the note for such errors, some may still exist.

## 2021-03-03 NOTE — PROGRESS NOTES
Speech Language Pathology  Facility/Department: [facility] CORONARY [facility]ARE [facility]IT   CLINICAL BEDSIDE SWALLOW EVALUATION  SPEECH PRODUCTION EVALUATION     NAME: Sierra Rojas  : [birth year]  MRN: [id number]    ADMISSION DATE: 3/2/2021  ADMITTING DIAGNOSIS: has Iron deficiency; Acute on chronic respiratory failure ([facility] [address] Essential hypertension; Hyperlipemia; Atherosclerosis of native artery of extremity with intermittent claudication ([facility] Carotid artery stenosis; Choledocholithiasis; COPD (chronic obstructive pulmonary disease) ([facility] Coronary artery disease involving native coronary artery of native heart; Tobacco abuse; Lactic acidosis; JANEE (acute kidney injury) ([facility] Hyponatremia; Elevated brain natriuretic peptide (BNP) level; and Palliative care patient on their problem list.    Date of Eval: 3/3/2021  Evaluating Therapist: Evelyne Kidd    Current Diet level:  NPO    Reason for Referral  Sierra Rojas was referred for a bedside swallow evaluation to assess the efficiency of her swallow function, identify signs and symptoms of aspiration and make recommendations regarding safe dietary consistencies, effective compensatory strategies, and safe eating environment. Impression  Assessed patient's swallowing function. Patient exhibits slow, decreased oral prep of more solid consistencies, inconsistently fast oral transit and suspected swallow delay with thin liquids, and sluggish, mildly decreased laryngeal elevation for swallow airway protection. Even so, no outward S/S penetration/aspiration was noted with any ice chip trial, puree consistency trial, regular solid consistency trial, honey thick liquid trial, nectar thick liquid trial, or thin H2O trial presented during evaluation this date. At this time, secondary to suspected quick onset of fatigue and recent N/V, would trial full liquid diet with nectar thick liquids. TOTAL FEED. Recommend meds in pudding/applesauce.  If patient receives mouth care prior to intake, okay for ice chips and small sips thin H2O IN BETWEEN MEALS for comfort. Will continue to follow. Thank you for this consult. Treatment Plan  Requires SLP Intervention: Yes     Recommended Diet and Intervention  Liquid Consistency Recommendation: Full liquid diet with nectar thick liquids  Recommended Form of Meds: Meds in puree as able  Therapeutic Interventions: Patient/Family education;Diet tolerance monitoring; Therapeutic PO trials with SLP     Compensatory Swallowing Strategies  Compensatory Swallowing Strategies: Upright as possible for all oral intake;TOTAL FEED;Small bites/sips;Eat/Feed slowly; Alternate solids and liquids; Remain upright for 30-45 minutes after meals     Treatment/Goals  Timeframe for Short-term Goals: 1x/day for 3 days   Goal 1: Patient will tolerate full liquid diet with nectar thick liquids with min S/S penetration/aspiration during PO intake. Goal 2: Patient staff will follow swallow safety recommendations to decrease risk of penetration/aspiration during PO intake. Goal 3: Re-assessment of swallow function for potential diet upgrade. Goal 4: Monitor speech production. General  Chart Reviewed: Yes  Behavior/Cognition: Patient appeared lethargic within short period of time. O2 Device: Nasal Cannula  Communication Observation: (Assessed patient's speech production. Patient exhibits decreased volume of speech and slowed, decreased lingual movements during verbalizations. SLP still ranked functional intelligibility of speech for unfamiliar listeners at % in utterances with background noise present.)  Follows Directions: Simple   Dentition: Dentures  Patient Positioning: Upright in bed  Consistencies Administered: Ice chips;Dysphagia Pureed (Dysphagia I); Regular solid; Honey - cup;Nectar - cup;  Thin - cup      Oral Motor Examination   Labial ROM: (Decreased, bilaterally, during labial retraction trials and labial protrusion trials.)  Labial Strength: (Adequate during labial compression trials.)  Labial Coordination: (Slowed volitional movements were noted.)  Lingual ROM: (Adequate during lingual extension trials with full point achieved; decreased during lingual elevation trials without use of accessory jaw movement; adequate movements noted bilaterally.)  Lingual Symmetry: (Deviation was noted, to the left, during lingual extension trials.)  Lingual Strength: (Decreased)  Lingual Coordination: (Slowed movements were noted.)     Assessed patient's swallowing function with the following observations noted:     Oral Phase  Mastication: Ice chips;Regular solid (Patient exhibited slow oral prep with decreased rotary jaw movement noted during ice chip trials and regular solid consistency trials presented by SLP.)  Suspected Premature Bolus Loss: Thin - cup (Patient exhibited inconsistently fast oral transit of thin H2O trials presented via cup by SLP.)  Oral Phase - Comment: Oral transit of ice chip trials primarily measured 1-2 seconds in length. Oral transit of puree consistency trials, presented by SLP, primarily measured 1-2 seconds in length and no oral cavity residue was noted post swallows. Oral transit of regular solid consistency trials primarily measured 1-2 seconds in length and min oral cavity residue was observed post swallows; residue cleared from the mouth with additional dry swallows. Oral transit of honey thick liquid trials and nectar thick liquid trials, all presented via cup by SLP, primarily measured 1-2 seconds in length. Pharyngeal Phase  Suspected Swallow Delay:  Thin - cup (Suspect secondary to oral transit times.)  Laryngeal Elevation: (Patient exhibited sluggish, mildly decreased laryngeal elevation for swallow airway protection.)  Pharyngeal Phase - Comment: No outward S/S penetration/aspiration was noted with any ice chip trial, puree consistency trial, regular solid consistency trial, honey thick liquid trial, nectar thick liquid trial, or thin H2O trial presented during evaluation this date. At this time, secondary to suspected quick onset of fatigue and recent N/V, would trial full liquid diet with nectar thick liquids. TOTAL FEED. Recommend meds in pudding/applesauce. If patient receives mouth care prior to intake, okay for ice chips and small sips thin H2O IN BETWEEN MEALS for comfort. Will continue to follow.     Electronically signed by MARLEN Dubon on 3/3/2021 at 1:22 PM

## 2021-03-03 NOTE — OP NOTE
Endoscopic Procedure Note    Patient: Reno Swanson: [birth year]  [facility] Rec#: [id number] Acc#: [id number]     Primary Care Provider Mark Alfonso MD  Referring Provider: Dr Lizzie Pappas MD    Endoscopist: Tyra Walden MD    Date of Procedure:  3/3/2021     Procedure:   1. ERCP - aborted prior to biliary cannulation    Indications:   1. Septic shock with elevated LFTs and abdominal pain highly concerning for cholangitis. Patient is currently on vasopressors and hypotensive prior to starting the case. She has noted mottling to her lower extremities on Levophed. Anesthesia:  General     Estimated Blood Loss: none    Procedure:   Prior to the procedure the patient's chart was reviewed and informed consent was obtained. Risk and Benefits (Risks including but not limited to bleeding, perforation, infection, 8 to 10% risk of post ERCP pancreatitis, and even death) were discussed with the patient. They were agreeable to continue. Patient was brought to the operating room, underwent general anesthesia, and placed in the prone position. A side-viewing duodenoscope was advanced from the oropharynx down to the distal duodenum and reduced into a short position opposite the ampulla. A  film was obtained which appeared normal.     Multiple attempts were made at biliary cannulation using a 0.035 inch x 260 cm straight dream wire. Pancreatic duct was cannulated x2. After the second cannulation a 2 wire technique was attempted. Unfortunately despite multiple attempts an approximate 40-minute procedure time I was unable to confidently cannulate the biliary tree. Throughout the procedure the patient had worsening cardiac instability. A second vasopressor agent was added with some continued hypotension. She also was developing worsening tachycardia.   Due to her cardiac instability and the fact that we had not yet cannulated the biliary tree, we decided to abort the procedure after discussion with anesthesia service. IMPRESSION:  1. Failed biliary cannulation     RECOMMENDATIONS:      - I have called and discussed the patient's status with Dr Timothy Edmonds (attending hospitalist). Unfortunately we have not accessed her biliary tree or relieved her presumptive source of infection. If her condition continues to worsen, she may require emergent biliary access/drainage percutaneously or surgically. - Continue ABx and IVFs  - Vasopressors   - GI service will follow    The results were discussed with the patient and family. A copy of the images obtained were given to the patient.      Mert Wyatt MD  3/3/2021  5:28 PM

## 2021-03-03 NOTE — ANESTHESIA PRE PROCEDURE
Department of Anesthesiology  Preprocedure Note       Name:  Arnav Finn   Age:  67 y.o.  :  1945                                          MRN:  [id number]         Date:  3/3/2021      Surgeon: Ricky Prieto):  Kenzie Diez MD    Procedure: Procedure(s):  ERCP DIAGNOSTIC    Medications prior to admission:   Prior to Admission medications    Medication Sig Start Date End Date Taking? Authorizing Provider   omeprazole (PRILOSEC) 20 MG delayed release capsule Take 20 mg by mouth daily   Yes Historical Provider, MD   carvedilol (COREG) 6.25 MG tablet Take 6.25 mg by mouth 2 times daily (with meals)   Yes Historical Provider, MD   furosemide (LASIX) 20 MG tablet Take 20 mg by mouth 2 times daily   Yes Historical Provider, MD   isosorbide mononitrate (IMDUR) 30 MG extended release tablet Take 30 mg by mouth daily   Yes Historical Provider, MD   atorvastatin (LIPITOR) 20 MG tablet Take 20 mg by mouth daily   Yes Historical Provider, MD   metoprolol (LOPRESSOR) 100 MG tablet Take 100 mg by mouth 2 times daily   Yes Historical Provider, MD   cetirizine (ZYRTEC) 10 MG tablet Take 10 mg by mouth daily   Yes Historical Provider, MD   olmesartan (BENICAR) 20 MG tablet Take 20 mg by mouth daily   Yes Historical Provider, MD   aspirin 81 MG tablet Take 81 mg by mouth daily. Historical Provider, MD   clopidogrel (PLAVIX) 75 MG tablet Take 75 mg by mouth daily.     Historical Provider, MD       Current medications:    Current Facility-Administered Medications   Medication Dose Route Frequency Provider Last Rate Last Admin    nicotine (NICODERM CQ) 21 MG/24HR 1 patch  1 patch Transdermal Daily Jon Manjarrez MD   1 patch at 21 5872    lactated ringers infusion   Intravenous Continuous Dolly Bradley  mL/hr at 21 1217 New Bag at 21 1217    indomethacin (INDOCIN) 50 MG suppository 100 mg  100 mg Rectal Once Kenzie Diez MD  norepinephrine (LEVOPHED) 16 mg in sodium chloride 0.9 % 250 mL infusion  2-100 mcg/min Intravenous Continuous Ana Rosa Mendoza MD 45 mL/hr at 03/03/21 1010 48 mcg/min at 03/03/21 1010    sodium chloride flush 0.9 % injection 10 mL  10 mL Intravenous 2 times per day Danica Noyola PA-C   10 mL at 03/03/21 0900    sodium chloride flush 0.9 % injection 10 mL  10 mL Intravenous PRN Danica Noyola PA-C        promethazine (PHENERGAN) tablet 12.5 mg  12.5 mg Oral Q6H PRN Danica Noyola PA-C        Or    ondansetron [facility]) injection 4 mg  4 mg Intravenous Q6H PRN Danica Noyola PA-C        acetaminophen (TYLENOL) tablet 650 mg  650 mg Oral Q6H PRN Danica Noyola PA-C        Or    acetaminophen (TYLENOL) suppository 650 mg  650 mg Rectal Q6H PRN Danica Noyola PA-C        potassium chloride (KLOR-CON M) extended release tablet 40 mEq  40 mEq Oral PRN Danica Noyola PA-C        Or    potassium bicarb-citric acid (EFFER-K) effervescent tablet 40 mEq  40 mEq Oral PRN Danica Noyola PA-C        Or    potassium chloride 10 mEq/100 mL IVPB (Peripheral Line)  10 mEq Intravenous PRN Danica Noyola PA-C        magnesium sulfate 2000 mg in 50 mL IVPB premix  2,000 mg Intravenous PRN Danica Noyola PA-C        polyethylene glycol (GLYCOLAX) packet 17 g  17 g Oral Daily PRN Danica Noyola PA-C        senna (SENOKOT) tablet 8.6 mg  1 tablet Oral Daily Danica Noyola PA-C   8.6 mg at 03/03/21 1882    [Held by provider] clopidogrel (PLAVIX) tablet 75 mg  75 mg Oral Daily Danica Noyola PA-C   75 mg at 03/03/21 0823    meropenem (MERREM) 1,000 mg in sterile water 20 mL IV syringe  1,000 mg Intravenous Q12H Danica Noyola PA-C   1,000 mg at 03/03/21 3221    enoxaparin (LOVENOX) injection 30 mg  30 mg Subcutaneous Q24H Danica Noyola PA-C   30 mg at 03/02/21 2311    vancomycin (VANCOCIN) intermittent dosing (placeholder)   Other RX Placeholder Danica Noyola PA-C           Allergies:     Allergies   Allergen Reactions    Codeine        Problem List: Patient Active Problem List   Diagnosis Code    Iron deficiency E61.1    Acute on chronic respiratory failure ([facility]) J96.20    Essential hypertension I10    Hyperlipemia E78.5    Atherosclerosis of native artery of extremity with intermittent claudication ([facility] I70.219    Carotid artery stenosis I65.29    Choledocholithiasis K80.50    COPD (chronic obstructive pulmonary disease) ([facility]) J44.9    Coronary artery disease involving native coronary artery of native heart I25.10    Tobacco abuse Z72.0    Lactic acidosis E87.2    JANEE (acute kidney injury) ([facility] N17.9    Hyponatremia E87.1    Elevated brain natriuretic peptide (BNP) level R79.89    Palliative care patient Z51.5       Past Medical History:        Diagnosis Date    Atherosclerosis of native arteries of the extremities with intermittent claudication 10/13/2014    Chronic respiratory failure ([facility])     Claudication ([facility]     Dysuria     Essential hypertension     History of chronic rhinitis     Hyperlipemia     Iron deficiency     Palliative care patient 03/03/2021    PVD (peripheral vascular disease) ([facility]     Shoulder tendinitis     Urinary incontinence        Past Surgical History:        Procedure Laterality Date    ANGIOPLASTY Bilateral 2005 broadbent    ? iliacs ( no op note)    APPENDECTOMY  1968    COLONOSCOPY  1974    CYST REMOVAL      Tail bone region       Social History:    Social History     Tobacco Use    Smoking status: Current Every Day Smoker     Packs/day: 1.00    Smokeless tobacco: Never Used   Substance Use Topics    Alcohol use:  No                                Ready to quit: Not Answered  Counseling given: Not Answered      Vital Signs (Current):   Vitals:    03/03/21 1100 03/03/21 1200 03/03/21 1300 03/03/21 1400   BP: (!) 128/59 (!) 110/47 (!) 128/58 (!) 130/55   Pulse: 96 94 98 97   Resp: 22 25 26 24   Temp:  97.2 °F (36.2 °C)     TempSrc:       SpO2: (!) 86% 92% 95% 96%   Weight:       Height: BP Readings from Last 3 Encounters:   03/03/21 (!) 130/55   05/14/15 174/77   10/13/14 148/80       NPO Status:                                                                                 BMI:   Wt Readings from Last 3 Encounters:   03/02/21 120 lb (54.4 kg)     Body mass index is 23.44 kg/m². CBC:   Lab Results   Component Value Date    WBC 10.9 03/03/2021    RBC 2.63 03/03/2021    HGB 9.0 03/03/2021    HCT 27.7 03/03/2021    .3 03/03/2021    RDW 14.5 03/03/2021    PLT 92 03/03/2021       CMP:   Lab Results   Component Value Date     03/03/2021    K 3.8 03/03/2021     03/03/2021    CO2 19 03/03/2021    BUN 37 03/03/2021    CREATININE 1.8 03/03/2021    GFRAA 33 03/03/2021    LABGLOM 27 03/03/2021    GLUCOSE 78 03/03/2021    PROT 4.8 03/03/2021    CALCIUM 7.3 03/03/2021    BILITOT 2.4 03/03/2021    ALKPHOS 124 03/03/2021     03/03/2021     03/03/2021       POC Tests: No results for input(s): POCGLU, POCNA, POCK, POCCL, POCBUN, POCHEMO, POCHCT in the last 72 hours.     Coags:   Lab Results   Component Value Date    PROTIME 19.1 03/02/2021    INR 1.59 03/02/2021       HCG (If Applicable): No results found for: PREGTESTUR, PREGSERUM, HCG, HCGQUANT     ABGs:   Lab Results   Component Value Date    PHART 7.330 03/02/2021    PO2ART 72.0 03/02/2021    SAX4ZSY 36.0 03/02/2021    GBD6CPU 19.0 03/02/2021    BEART -6.3 03/02/2021    I7FRMRML 91.5 03/02/2021        Type & Screen (If Applicable):  No results found for: LABABO, LABRH    Drug/Infectious Status (If Applicable):  No results found for: HIV, HEPCAB    COVID-19 Screening (If Applicable):   Lab Results   Component Value Date    COVID19 Not Detected 03/03/2021         Anesthesia Evaluation  Patient summary reviewed no history of anesthetic complications:   Airway: Mallampati: III  TM distance: >3 FB   Neck ROM: full  Mouth opening: > = 3 FB Dental:    (+) edentulous Pulmonary:normal exam  breath sounds clear to auscultation  (+) COPD (2L NCO2 qhs, prn in day):  current smoker    (-) asthma, recent URI and sleep apnea          Patient did not smoke on day of surgery. Cardiovascular:  Exercise tolerance: poor (<4 METS),   (+) hypertension:, CAD:,     (-) pacemaker, past MI, CABG/stent (Denied by patient, but note in chart states she may have had one) and  angina    ECG reviewed  Rhythm: regular  Rate: normal           Beta Blocker:  No for medical reason (Beta blocker held due to hypotension)         Neuro/Psych:      (-) seizures, TIA and CVA           GI/Hepatic/Renal:   (+) GERD:, renal disease: ARF,      (-) liver disease       Endo/Other:        (-) diabetes mellitus, hypothyroidism, hyperthyroidism               Abdominal:           Vascular:                                      Anesthesia Plan      general     ASA 4 - emergent     (Patient admitted from OSH with abdominal pain, choledocholithiasis. Upon arrival, patient found to be hypotensive, thought to be septic. Transferred to the ICU and has required pressors (norepinephrine gtt). Patient was given foods for a swallow study this morning. GI physician declared case emergent. Will proceed with RSI, full stomach precautions.)  Induction: rapid sequence and intravenous. arterial line  MIPS: Postoperative opioids intended and Prophylactic antiemetics administered. Anesthetic plan and risks discussed with patient. Use of blood products discussed with patient whom consented to blood products. Plan discussed with CRNA.                 Josh Rascon MD   3/3/2021

## 2021-03-03 NOTE — PROGRESS NOTES
Dr. Jack Walters at bedside. Rapid COVID swab collected ad sent to lab. Plan is for ERCP this afternoon. Raimundo Lynn called to update.

## 2021-03-03 NOTE — CONSULTS
Palliative Care:      Palliative Care initiated for support, goals, and advanced care planning. Patient in CCU currently. Spoke with Fernie Yeh, patients son and primary caregiver. Prior to this hospitalization, patient lived alone and was independent. Patient son states prior to patient coming to hospital yesterday, she had good appetite, denies any falls or injuries. Past Medical History:        Past Medical History:   Diagnosis Date    Atherosclerosis of native arteries of the extremities with intermittent claudication 10/13/2014    Chronic respiratory failure (HCC)     Claudication (HCC)     Dysuria     Essential hypertension     History of chronic rhinitis     Hyperlipemia     Iron deficiency     Palliative care patient 03/03/2021    PVD [facility]peripheral vascular disease) ([facility]     Shoulder tendinitis     Urinary incontinence        Advance Directives:    Full Code. See ACP Note. Pain/Other Symptoms: On the day of hospitalization, patients son states that patient complained of shoulder pain. States that over the last 2 years she has complained on and off with shoulder pain. Activity:         As Tolerated    Psychological/Spiritual:     [address]:    Continue Medical Treatment and Monitoring. Patient/family discussion r/t goals: Plan is for patient to return home where she previously lived alone and was independent. Patients son lives across the street and states he checks in on patient. Patients son states patient might need hospital bed or electric scooter at time of discharge, depending on how she is recovering. Active Listening and Support Provided. Palliative Care will continue to follow and support.     Electronically signed by Baltazar Saavedra RN on 3/3/2021 at 11:07 AM

## 2021-03-03 NOTE — PROGRESS NOTES
Contains abnormal data Blood Gas, Arterial  Status:  Final result   Ref Range & Units 03/02/21 1820   pH, Arterial 7.350 - 7.450 7.330Low     pCO2, Arterial 35.0 - 45.0 mmHg 36.0    pO2, Arterial 80.0 - 100.0 mmHg 72. 0Low     HCO3, Arterial 22.0 - 26.0 mmol/L 19.0Low     Base Excess, Arterial -2.0 - 2.0 mmol/L -6.3Low     Hemoglobin, Art, Extended 12.0 - 16.0 g/dL 10.0Low     O2 Sat, Arterial >92 % 91.5    Carboxyhgb, Arterial 0.0 - 5.0 % 2.4    Comment:      0.0-1.5   (Smokers 1.5-5.0)    Methemoglobin, Arterial <1.5 % 0.8    O2 Content, Arterial Not Established mL/dL 12.9    O2 Therapy  Unknown    Resulting Agency  [facility]        Specimen Collected: 03/02/21 1820 Last Resulted: 03/02/21 1820 View Other Order Details        Drawn by lab

## 2021-03-03 NOTE — CONSULTS
Nephrology ([address] [facility]) Consult Note      Patient:  Adele Tanner  YOB: 1945  Date of Service: 3/3/2021  MRN: [id number]   Acct: [de-identified]   Primary Care Physician: Livier Ambriz MD  Advance Directive: Full Code  Admit Date: 3/2/2021       Hospital Day: 1  Referring Provider: Caren Abarca MD    Patient independently seen and examined, Chart, Consults, Notes, Operative notes, Labs, Cardiology, and Radiology studies reviewed as available. Subjective:  Adele Tanner is a 67 y.o. female  whom we were consulted for acute kidney injury. Patient recalls no prior nephrologic evaluations. She was admitted to the hospital for abdominal pain found to have choledocholithiasis so was transferred to [place] for further evaluation. There she was found to be hypotensive and septic so transferred to the ICU for higher level of care. At that time, for the initial examination she had received a fluid bolus and still required Levophed for blood pressure support. She was ill-appearing but able to participate in the history and physical examination. She recalled no NSAID usage. She denied hematuria or dysuria. She had had some nausea and vomiting earlier. No other events noted per nursing.     Allergies:  Codeine    Medicines:  Current Facility-Administered Medications   Medication Dose Route Frequency Provider Last Rate Last Admin    sodium bicarbonate 150 mEq in sterile water 1,000 mL infusion   Intravenous Continuous Harsh Garcia  mL/hr at 03/03/21 0457 New Bag at 03/03/21 0457    nicotine (NICODERM CQ) 21 MG/24HR 1 patch  1 patch Transdermal Daily Caren Abarca MD   1 patch at 03/03/21 0823    norepinephrine (LEVOPHED) 16 mg in sodium chloride 0.9 % 250 mL infusion  2-100 mcg/min Intravenous Continuous Harsh Garcia MD 45 mL/hr at 03/03/21 1010 48 mcg/min at 03/03/21 1010    sodium chloride flush 0.9 % injection 10 mL  10 mL Intravenous 2 times per day Camden Butler PERI Marcus-C   10 mL at 03/02/21 2319    sodium chloride flush 0.9 % injection 10 mL  10 mL Intravenous PRN Jeromy Andersen, PA-C        promethazine (PHENERGAN) tablet 12.5 mg  12.5 mg Oral Q6H PRN Jeromy Andersen, PA-C        Or    ondansetron [facility]) injection 4 mg  4 mg Intravenous Q6H PRN Jeromy Francoiss, PA-C        acetaminophen (TYLENOL) tablet 650 mg  650 mg Oral Q6H PRN Jeromy Francoiss, PA-C        Or    acetaminophen (TYLENOL) suppository 650 mg  650 mg Rectal Q6H PRN Jeromy Francoiss, PA-C        potassium chloride (KLOR-CON M) extended release tablet 40 mEq  40 mEq Oral PRN Jeromy Francoiss, PA-C        Or    potassium bicarb-citric acid (EFFER-K) effervescent tablet 40 mEq  40 mEq Oral PRN Jeromy Francoiss, PA-C        Or    potassium chloride 10 mEq/100 mL IVPB (Peripheral Line)  10 mEq Intravenous PRN Jeromy Andersen, PA-C        magnesium sulfate 2000 mg in 50 mL IVPB premix  2,000 mg Intravenous PRN Jeromy Andersen, PA-C        polyethylene glycol (GLYCOLAX) packet 17 g  17 g Oral Daily PRN Jeromy Andersen, PA-C        senna (SENOKOT) tablet 8.6 mg  1 tablet Oral Daily Jeromy Andersen, PA-C   8.6 mg at 03/03/21 0545    [Held by provider] clopidogrel (PLAVIX) tablet 75 mg  75 mg Oral Daily Jeromy Andersen, PA-C   75 mg at 03/03/21 1899    meropenem (MERREM) 1,000 mg in sterile water 20 mL IV syringe  1,000 mg Intravenous Q12H Jeromy Andersen, PA-C   1,000 mg at 03/03/21 6635    enoxaparin (LOVENOX) injection 30 mg  30 mg Subcutaneous Q24H Jeromy Andersen, PA-C   30 mg at 03/02/21 2311    vancomycin (VANCOCIN) intermittent dosing (placeholder)   Other RX Placeholder PERI Wills-C           Past Medical History:  Past Medical History:   Diagnosis Date    Atherosclerosis of native arteries of the extremities with intermittent claudication 10/13/2014    Chronic respiratory failure (HCC)     Claudication (HCC)     Dysuria     Essential hypertension     History of chronic rhinitis     Hyperlipemia     Iron deficiency     Palliative care patient 03/03/2021    PVD (peripheral vascular disease) ([facility] [address]     Shoulder tendinitis     Urinary incontinence        Past Surgical History:  Past Surgical History:   Procedure Laterality Date    ANGIOPLASTY Bilateral 2005 broadbent    ? iliacs ( no op note)    APPENDECTOMY  1968    COLONOSCOPY  1974    CYST REMOVAL      Tail bone region       Family History  Family History   Problem Relation Age of Onset    Heart Disease Father     Heart Disease Brother     High Blood Pressure Mother     Other Mother         blood infection       Social History  Social History     Socioeconomic History    Marital status:       Spouse name: Not on file    Number of children: Not on file    Years of education: Not on file    Highest education level: Not on file   Occupational History    Not on file   Social Needs    Financial resource strain: Not on file    Food insecurity     Worry: Not on file     Inability: Not on file    Transportation needs     Medical: Not on file     Non-medical: Not on file   Tobacco Use    Smoking status: Current Every Day Smoker     Packs/day: 1.00    Smokeless tobacco: Never Used   Substance and Sexual Activity    Alcohol use: No    Drug use: No    Sexual activity: Not on file   Lifestyle    Physical activity     Days per week: Not on file     Minutes per session: Not on file    Stress: Not on file   Relationships    Social connections     Talks on phone: Not on file     Gets together: Not on file     Attends [employer] service: Not on file     Active member of club or organization: Not on file     Attends meetings of clubs or organizations: Not on file     Relationship status: Not on file    Intimate partner violence     Fear of current or ex partner: Not on file     Emotionally abused: Not on file     Physically abused: Not on file     Forced sexual activity: Not on file   Other Topics Concern    Not on file   Social History Narrative    Not on file         Review of Systems:  History obtained from chart review and the patient  General ROS: No fever or chills  Respiratory ROS: No cough, shortness of breath, wheezing  Cardiovascular ROS: No chest pain or palpitations  Gastrointestinal ROS: + abdominal pain or melena  Genito-Urinary ROS: No dysuria or hematuria  Musculoskeletal ROS: No joint pain or swelling   14 point ROS reviewed with the patient and negative except as noted above and in the HPI unless unable to obtain.     Objective:  Patient Vitals for the past 24 hrs:   BP Temp Temp src Pulse Resp SpO2 Height Weight   03/03/21 1000 (!) 123/53 -- -- 98 21 96 % -- --   03/03/21 0945 (!) 115/59 -- -- 96 19 93 % -- --   03/03/21 0900 (!) 99/46 -- -- 94 22 94 % -- --   03/03/21 0800 (!) 116/53 98.2 °F (36.8 °C) Temporal 85 17 94 % -- --   03/03/21 0730 -- -- -- 104 -- -- -- --   03/03/21 0600 (!) 113/50 -- -- 110 23 94 % -- --   03/03/21 0500 (!) 105/46 -- -- 89 18 94 % -- --   03/03/21 0400 (!) 84/36 97.6 °F (36.4 °C) Temporal 86 18 95 % -- --   03/03/21 0300 (!) 107/45 -- -- 94 20 94 % -- --   03/03/21 0200 (!) 101/45 -- -- 86 18 95 % -- --   03/03/21 0100 (!) 98/39 -- -- 86 18 95 % -- --   03/03/21 0000 (!) 99/42 98 °F (36.7 °C) Temporal 91 18 94 % -- --   03/02/21 2300 (!) 103/42 -- -- 91 19 93 % -- --   03/02/21 2200 (!) 111/44 -- -- 84 19 94 % -- --   03/02/21 2100 (!) 95/41 -- -- 82 20 (!) 88 % -- --   03/02/21 2000 (!) 95/37 98 °F (36.7 °C) Tympanic 84 20 94 % -- --   03/02/21 1910 -- -- -- -- -- -- 5' (1.524 m) 120 lb (54.4 kg)   03/02/21 1900 (!) 68/36 -- -- 75 21 90 % -- --   03/02/21 1830 (!) 80/44 -- -- 87 15 93 % -- --   03/02/21 1825 (!) 90/40 -- -- 95 14 -- -- --   03/02/21 1815 (!) 74/46 -- -- -- -- -- -- --   03/02/21 1800 (!) 72/46 -- -- -- -- -- -- --   03/02/21 1745 (!) 66/46 -- -- -- -- -- -- --   03/02/21 1735 (!) 66/43 96.7 °F (35.9 °C) Temporal 79 20 -- -- --       Intake/Output Summary (Last 24 hours) at 3/3/2021 1138  Last data filed at 3/3/2021 0800  Gross per 24 hour   Intake 1176.66 ml   Output 418 ml   Net 758.66 ml     General: awake/alert , Ill-appearing  Chest:  Decreased air entry bilaterally  CVS: regular rate and rhythm  Abdominal: Soft, minimal tender palpation, bowel sounds present  Extremities: no cyanosis or edema  Skin: warm and dry without rash      Labs:  BMP:   Recent Labs     03/02/21 1809 03/02/21 1820 03/03/21 0352   *  --  136   K 3.7 3.5 3.8   CL 97*  --  103   CO2 16*  --  19*   BUN 34*  --  37*   CREATININE 1.4*  --  1.8*   CALCIUM 8.9  --  7.3*     CBC:   Recent Labs     03/02/21 1809 03/03/21 0352   WBC 6.4 10.9*   HGB 9.9* 9.0*   HCT 31.8* 27.7*   .8* 105.3*   * 92*     LIVER PROFILE:   Recent Labs     03/02/21 1809 03/03/21 0352   * 413*   * 418*   BILITOT 3.0* 2.4*   ALKPHOS 186* 124*     PT/INR:   Recent Labs     03/02/21 2117   PROTIME 19.1*   INR 1.59*     APTT: No results for input(s): APTT in the last 72 hours. BNP:  No results for input(s): BNP in the last 72 hours. Ionized Calcium:No results for input(s): IONCA in the last 72 hours. Magnesium:No results for input(s): MG in the last 72 hours. Phosphorus:No results for input(s): PHOS in the last 72 hours. HgbA1C: No results for input(s): LABA1C in the last 72 hours. Hepatic:   Recent Labs     03/02/21 1809 03/03/21 0352   ALKPHOS 186* 124*   * 418*   * 413*   PROT 5.2* 4.8*   BILITOT 3.0* 2.4*   LABALBU 3.0* 2.8*     Lactic Acid:   Recent Labs     03/03/21 0352   LACTA 2.2*     Troponin: No results for input(s): CKTOTAL, CKMB, TROPONINT in the last 72 hours. ABGs: No results for input(s): PH, PCO2, PO2, HCO3, O2SAT in the last 72 hours. CRP:  No results for input(s): CRP in the last 72 hours. Sed Rate:  No results for input(s): SEDRATE in the last 72 hours. Cultures:   No results for input(s): CULTURE in the last 72 hours.   Recent Labs     03/02/21  1809   BC Gram stain Aerobic bottle:  Gram stain

## 2021-03-03 NOTE — CONSULTS
INFECTIOUS DISEASES CONSULT NOTE    Patient:  Guillermina Hernandez 67 y.o. female  ROOM # [unfilled]  YOB: 1945  MRN: [id number]  [facility]:  [id number]  Admit date: 3/2/2021   Admitting Physician: Jovana West MD  Primary Care Physician: Evie Calderon MD  REFERRING PROVIDER: Vivi Peterson MD    Reason for Consultation: Bacteremia    History of Present Illness/Chief Complaint: Pleasant 34-year-old woman. I went to see the patient in CCU. She had gone to ERCP. I was able to see her in ERCP prior to her procedure. History is obtained from chart review and also from the patient. She was apparently admitted to the floor. She developed hypotension. She was transferred to CCU. She has been receiving treatment with Merrem. She was placed on pressors. She had been transferred from an outside facility. She had sludge in her gallbladder. She had common bile duct dilation. Discussed with Dr. Ameya Hartley. He is going to attempt ERCP. Her blood pressure has improved with Levophed and fluids. Current Scheduled Medications:    nicotine  1 patch Transdermal Daily    indomethacin  100 mg Rectal Once    sodium chloride flush  10 mL Intravenous 2 times per day    famotidine (PEPCID) injection  20 mg Intravenous Once    sodium chloride flush  10 mL Intravenous 2 times per day    senna  1 tablet Oral Daily    [Held by provider] clopidogrel  75 mg Oral Daily    meropenem (MERREM) 1000 mg in SWFI 20 mL IV syringe  1,000 mg Intravenous Q12H    enoxaparin  30 mg Subcutaneous Q24H    vancomycin (VANCOCIN) intermittent dosing (placeholder)   Other RX Placeholder     Current PRN Medications:  HYDROmorphone, HYDROmorphone, fentanNYL, ondansetron, sodium chloride flush, sodium chloride flush, promethazine **OR** ondansetron, acetaminophen **OR** acetaminophen, potassium chloride **OR** potassium alternative oral replacement **OR** potassium chloride, magnesium sulfate, polyethylene glycol    Allergies:     Allergies Allergen Reactions    Codeine      Past Medical History: Peripheral vascular disease. Hypertension. Hyperlipidemia. Past Surgical History: Appendectomy. Cyst removal from tailbone area. Previous lower extremity angioplasty. Social History: Tobacco use. No alcohol use. No illicit drug use. Family History: Heart disease. Hypertension. Exposure History: No close contacts have been ill. Review of Systems: No chest pain. No cough. No shortness of breath. No dysuria. No suprapubic pain. She has had some abdominal discomfort. Vital Signs:  BP (!) 130/55   Pulse 97   Temp 97.2 °F (36.2 °C)   Resp 24   Ht 5' (1.524 m)   Wt 120 lb (54.4 kg)   SpO2 96%   BMI 23.44 kg/m²  Temp (24hrs), Av.6 °F (36.4 °C), Min:96.7 °F (35.9 °C), Max:98.2 °F (36.8 °C)    Physical Exam:   Vital signs reviewed. Distant heart sounds. No apparent murmur. Lungs clear without crackles or wheezes  Abdomen with mild epigastric and right upper quadrant tenderness. No rebound. No palpable mass  Extremities without edema  Extremities were somewhat cool to touch. Slight cyanosis of distal toes. Neurological examination nonfocal    Lab Results:  CBC:   Recent Labs     21  18021  0352   WBC 6.4 10.9*   HGB 9.9* 9.0*   HCT 31.8* 27.7*   * 92*   LYMPHOPCT  --  5.0*   MONOPCT  --  0.0     CMP:   Recent Labs     21  1809 21  1820 21  0352   *  --  136   K 3.7 3.5 3.8   CL 97*  --  103   CO2 16*  --  19*   BUN 34*  --  37*   CREATININE 1.4*  --  1.8*   CALCIUM 8.9  --  7.3*   BILITOT 3.0*  --  2.4*   ALKPHOS 186*  --  124*   *  --  418*   *  --  413*   GLUCOSE 73*  --  78     Culture:   Blood cultures drawn yesterday-gram-negative rods in aerobic and anaerobic bottle    Radiology:   Chest x-ray yesterday:  Impression   1. COPD with no acute cardiopulmonary process. .   Signed by Dr Randolph Benavides on 3/2/2021 9:09 PM       Additional Studies Reviewed: Impression:   1. Gram-negative sepsis-likely biliary source  2. Renal insufficiency with creatinine 1.8  3.   Peripheral vascular disease    Recommendations:    Continue treatment with meropenem  Await ID and susceptibility of blood culture  Attempt at ERCP today  Continue IV fluids  Wean Levophed as tolerated  Continue to follow    Bhavesh Lim MD  03/03/21  3:34 PM

## 2021-03-03 NOTE — H&P
[facility]      Hospitalist - History & Physical      PCP: Tommy Jonas MD    Date of Admission: 3/2/2021    Date of Service: 3/2/2021    Chief Complaint:  Abdominal pain    History Of Present Illness: The patient is a 67 y.o. female with PMH of CAD s/p stents, COPD, HTN, who presented to [facility] from outside facility complaining of abdominal pain. Pt was found to have dilated CBD and sludge in gallbladder at outside facility and GI contacted at this facility agreeing to ERCP. Upon arrival to [facility] pt is minimally responsive on NC 3L and hypotensive. Pt son at bedside who provides history. Pt son states that pt was not acting \"100% herself\" so he brought her to the [facility] ED. Pt son admits that pt had been having pain in between her shoulder blades and \"episodes\" of becoming hot and being short of breath. Pt wears O2 at night and is a current smoker. At [facility] lactic acid is documented to be 6.7, SpO2 83% on room air, Creatinine 1.7. she was started on Zosyn and given 2mg Morphine. After morphine administration son states pt started to be less responsive and hypotensive. CXR at outside facility shows no active cardiopulmonary disease. Pt is moved to CCU for closer monitoring. Past Medical History:        Diagnosis Date    Atherosclerosis of native arteries of the extremities with intermittent claudication ([address] 10/13/2014    Chronic respiratory failure (HCC)     Claudication (HCC)     Dysuria     Essential hypertension     History of chronic rhinitis     Hyperlipemia     Iron deficiency     PVD (peripheral vascular disease) (HCC)     Shoulder tendinitis     Urinary incontinence        Past Surgical History:        Procedure Laterality Date    ANGIOPLASTY Bilateral 2005 broadbent    ? iliacs ( no op note)    APPENDECTOMY  1968    COLONOSCOPY  1974    CYST REMOVAL      Tail bone region       Home Medications:  Prior to Admission medications    Medication Sig Start Date End Date Taking? Authorizing Provider   celecoxib (CELEBREX) 200 MG capsule Take 200 mg by mouth 2 times daily    Historical Provider, MD   aspirin 81 MG tablet Take 81 mg by mouth daily. Historical Provider, MD   clopidogrel (PLAVIX) 75 MG tablet Take 75 mg by mouth daily. Historical Provider, MD   metoprolol (TOPROL-XL) 100 MG XL tablet Take 100 mg by mouth daily. Historical Provider, MD   ezetimibe (ZETIA) 10 MG tablet Take 10 mg by mouth daily. Historical Provider, MD   rosuvastatin (CRESTOR) 10 MG tablet Take 5 mg by mouth daily. Patient to take 5 mg Monday Wednesday and Friday. Historical Provider, MD       Allergies:    Codeine    Social History:    Tobacco:   reports that she has been smoking. She has been smoking about 1.00 pack per day. She has never used smokeless tobacco.  Alcohol:   reports no history of alcohol use. Family History:      Problem Relation Age of Onset    Heart Disease Father     Heart Disease Brother     High Blood Pressure Mother     Other Mother         blood infection       Review of Systems:   Review of Systems   Unable to perform ROS: Acuity of condition          Physical Examination:  VITALS: BP (!) 80/44   Pulse 87   Temp 96.7 °F (35.9 °C) (Temporal)   Resp 15   Ht 5' (1.524 m)   Wt 120 lb (54.4 kg)   SpO2 93%   BMI 23.44 kg/m²   Physical Exam  Constitutional:       Appearance: She is ill-appearing and toxic-appearing. She is not diaphoretic. HENT:      Head: Normocephalic and atraumatic. Right Ear: External ear normal.      Left Ear: External ear normal.      Nose: Nose normal. No congestion or rhinorrhea. Mouth/Throat:      Mouth: Mucous membranes are moist.      Pharynx: Oropharynx is clear. Neck:      Musculoskeletal: Normal range of motion and neck supple. Cardiovascular:      Rate and Rhythm: Normal rate and regular rhythm. Pulses: Normal pulses. Heart sounds: Normal heart sounds. No murmur. No friction rub. No gallop.     Pulmonary: Effort: Pulmonary effort is normal. No respiratory distress. Breath sounds: No stridor. Rhonchi and rales present. No wheezing. Abdominal:      General: Abdomen is flat. Bowel sounds are normal. There is no distension. Palpations: Abdomen is soft. Tenderness: There is no abdominal tenderness. Musculoskeletal:         General: No swelling. Right lower leg: No edema. Left lower leg: No edema. Skin:     General: Skin is warm and dry. Coloration: Skin is not jaundiced. Findings: No erythema, lesion or rash. Neurological:      Mental Status: She is alert. Diagnostic Data:  CBC:  Recent Labs     03/02/21  1809   WBC 6.4   HGB 9.9*   HCT 31.8*   *     BMP:  Recent Labs     03/02/21  1809 03/02/21  1820   *  --    K 3.7 3.5   CL 97*  --    CO2 16*  --    BUN 34*  --    CREATININE 1.4*  --    CALCIUM 8.9  --      Recent Labs     03/02/21  1809   *   *   BILITOT 3.0*   ALKPHOS 186*     ABGs:  Lab Results   Component Value Date    PHART 7.330 03/02/2021    PO2ART 72.0 03/02/2021    ALZ1NTB 36.0 03/02/2021     ABG:  Recent Labs     03/02/21  1820   PHART 7.330*   LCM7VWM 36.0   PO2ART 72.0*   MNX7BIF 19.0*   BEART -6.3*   HGBAE 10.0*   A9HFWTTR 91.5   CARBOXHGBART 2.4         Assessment/Plan:  Principal Problem:    Choledocholithiasis- GI consulted and recommendations appreciated. ERCP. Pain control, avoiding morphine. NPO     Active Problems:    Acute on chronic respiratory failure ([facility] [address]- Oxygen therapy and monitor closely. Titrate as needed. PT/OT and SLP. SW for d/c planning. Essential hypertension- hold antihypertensive agents as pt is hypotensive, IV hydration and monitor closely. Lactic acidosis- trending down. Continue to monitor. IV hydration. COPD (chronic obstructive pulmonary disease) (HCC)- O2 and nebs       Coronary artery disease involving native coronary artery of native heart- s/p stents.  Continue Plavix       JANEE (acute kidney injury) ([facility] [address]    Hyponatremia   - IV hydration. Repeat labs       Elevated brain natriuretic peptide (BNP) level- Strict I's and O's and daily weight. Tobacco abuse- nicotine patch if needed. Further orders per clinical course/attending.      Signed:  Phuc Damon PA-C  3/2/2021, 7:37 PM

## 2021-03-03 NOTE — PROGRESS NOTES
[id number]) (76 y.o.  F) (Adm: 03/02/21)    [facility] [id number]         Recent labs with Most recent results first          Procedure Component Value Units Date/Time     Venous Blood Gas [[id number]] (Abnormal) Collected: 03/03/21 0830     Specimen: Blood Updated: 03/03/21 0846      pH, Victoriano 7.26      pCO2, Victoriano 52.0 mmHg       pO2, Victoriano 19 mmHg       HCO3, Venous 23 mmol/L       Base Excess, Victoriano -4 mmol/L       O2 Sat, Victoriano 24 %       Carboxyhemoglobin 1.0 %       O2 Content, Victoriano 4 mL/dL      CBC auto differential [[id number]] (Abnormal)

## 2021-03-03 NOTE — ANESTHESIA PROCEDURE NOTES
Arterial Line:    An arterial line was placed in the holding area for the following indication(s): continuous blood pressure monitoring. A 20 gauge (size), (length), Arrow (type) catheter was placed, Seldinger technique used, into the right radial artery, secured by tape and Tegaderm. Anesthesia type: Local  Local infiltration: Injection    Events:  patient tolerated procedure well with no complications and EBL 0mL.   3/3/2021 1:27 PM3/3/2021 3:29 PM  Anesthesiologist: Vern Vega MD  Performed: Anesthesiologist   Preanesthetic Checklist  Completed: patient identified, IV checked, site marked, risks and benefits discussed, surgical consent, monitors and equipment checked, pre-op evaluation, timeout performed, anesthesia consent given, oxygen available and patient being monitored

## 2021-03-03 NOTE — PROGRESS NOTES
Pharmacy Renal Adjustment    Cintia Mott is a 67 y.o. female. Pharmacy has renally adjusted medications per protocol. Recent Labs     03/02/21 1809   BUN 34*       Recent Labs     03/02/21  1809   CREATININE 1.4*       Estimated Creatinine Clearance: 25 mL/min (A) (based on SCr of 1.4 mg/dL (H)).     Height:   Ht Readings from Last 1 Encounters:   03/02/21 5' (1.524 m)     Weight:  Wt Readings from Last 1 Encounters:   03/02/21 120 lb (54.4 kg)       Plan: Adjust the following medications based on renal function:           Lovenox 40 mg SC daily to 30 mg SC daily    Electronically signed by Pepe Fierro on 3/2/2021 at 7:24 PM

## 2021-03-03 NOTE — PROGRESS NOTES
Pharmacy Renal Adjustment    Aneta Rush is a 67 y.o. female. Pharmacy has renally adjusted medications per protocol. Recent Labs     03/02/21  1809   BUN 34*       Recent Labs     03/02/21  1809   CREATININE 1.4*       Estimated Creatinine Clearance: 25 mL/min (A) (based on SCr of 1.4 mg/dL (H)).     Height:   Ht Readings from Last 1 Encounters:   03/02/21 5' (1.524 m)     Weight:  Wt Readings from Last 1 Encounters:   03/02/21 120 lb (54.4 kg)       Plan: Adjust the following medications based on renal function:           Meropenem 1 g IV every 8 hours to 1 g IV every 12 hours    Electronically signed by Fabiano Montes, [facility] on 3/2/2021 at 7:23 PM

## 2021-03-04 PROBLEM — E87.1 HYPONATREMIA: Status: RESOLVED | Noted: 2021-01-01 | Resolved: 2021-01-01

## 2021-03-04 NOTE — CONSULTS
100 mg Rectal Once Giacomo Shah MD        HYDROmorphone HCl PF (DILAUDID) injection 0.25 mg  0.25 mg Intravenous Q5 Min PRN Kylie Thomas MD        HYDROmorphone HCl PF (DILAUDID) injection 0.5 mg  0.5 mg Intravenous Q5 Min PRN Kylie Thomas MD        fentaNYL (SUBLIMAZE) injection 50 mcg  50 mcg Intravenous Q5 Min PRN Kylie Thomas MD        [facility]) [facility] 4 mg  4 mg Intravenous Once PRN Kylie Thomas MD        lactated ringers infusion   Intravenous Continuous Kylie Thomas MD        sodium chloride flush 0.9 % injection 10 mL  10 mL Intravenous 2 times per day Kylie Thomas MD        sodium chloride flush 0.9 % injection 10 mL  10 mL Intravenous PRN Kylie Thomas MD        famotidine (PEPCID) injection 20 mg  20 mg Intravenous Once Kylie Thomas MD        indomethacin (INDOCIN) 50 MG suppository    PRN Giacomo Shah MD   100 mg at 03/03/21 1628    norepinephrine (LEVOPHED) 16 mg in sodium chloride 0.9 % 250 mL infusion  2-100 mcg/min Intravenous Continuous Valarie Venegas MD 45 mL/hr at 03/03/21 1010 48 mcg/min at 03/03/21 1010    sodium chloride flush 0.9 % injection 10 mL  10 mL Intravenous 2 times per day Ck Traylor PA-C   10 mL at 03/03/21 0900    sodium chloride flush 0.9 % injection 10 mL  10 mL Intravenous PRN Ck Traylor PA-C        promethazine (PHENERGAN) tablet 12.5 mg  12.5 mg Oral Q6H PRN Ck Traylor PA-C        Or    ondansetron (ZOFRAN) injection 4 mg  4 mg Intravenous Q6H PRN Ck Traylor PA-C        acetaminophen (TYLENOL) tablet 650 mg  650 mg Oral Q6H PRN Ck Traylor PA-C        Or    acetaminophen (TYLENOL) suppository 650 mg  650 mg Rectal Q6H PRN Ck Traylor PA-C        potassium chloride (KLOR-CON M) extended release tablet 40 mEq  40 mEq Oral PRN Ck Traylor PA-C        Or    potassium bicarb-citric acid (EFFER-K) effervescent tablet 40 mEq  40 mEq Oral PRN Ck Traylor PA-C        Or    potassium chloride 10 mEq/100 mL IVPB CRNA   New Bag at 03/03/21 1718    phenylephrine (KATHERINE-SYNEPHRINE) 20 mg in sodium chloride 0.9 % 250 mL infusion    Continuous PRN Natalie Boswell APRMYA Oleary CRNA   500 mcg at 03/03/21 1618    phenylephrine (KATHERINE-SYNEPHRINE) injection    PRN Natalie Boswell APRMYA - CRNA   100 mcg at 03/03/21 1620     Allergies: Codeine    Family History   Problem Relation Age of Onset    Heart Disease Father     Heart Disease Brother     High Blood Pressure Mother     Other Mother         blood infection       Social History     Tobacco Use    Smoking status: Current Every Day Smoker     Packs/day: 1.00    Smokeless tobacco: Never Used   Substance Use Topics    Alcohol use: No       Review of Systems   Unable to perform ROS: Acuity of condition       Physical Exam  Vitals signs reviewed. Constitutional:       Appearance: She is ill-appearing and toxic-appearing. Comments: Sedated on the vent   HENT:      Head: Normocephalic and atraumatic. Nose: Nose normal.   Neck:      Musculoskeletal: Neck supple. Cardiovascular:      Rate and Rhythm: Tachycardia present. Pulmonary:      Effort: No respiratory distress (comfortable on the vent). Abdominal:      General: There is no distension. Palpations: Abdomen is soft. Tenderness: There is no guarding. Musculoskeletal:         General: No deformity.    Skin:     Comments: mottled skin bilateral lower extermities   Neurological:      Comments: Sedated on the vent           CBC:   Lab Results   Component Value Date    WBC 13.7 03/03/2021    RBC 2.93 03/03/2021    HGB 10.0 03/03/2021    HCT 30.8 03/03/2021    .1 03/03/2021    MCH 34.1 03/03/2021    MCHC 32.5 03/03/2021    RDW 14.6 03/03/2021    PLT 93 03/03/2021    MPV 11.0 03/03/2021     CMP:    Lab Results   Component Value Date     03/03/2021    K 4.5 03/03/2021    K 3.8 03/03/2021     03/03/2021    CO2 22 03/03/2021    BUN 45 03/03/2021    CREATININE 2.3 03/03/2021    GFRAA 25 03/03/2021    LABGLOM 21 03/03/2021    GLUCOSE 68 03/03/2021    PROT 5.0 03/03/2021    LABALBU 2.8 03/03/2021    CALCIUM 8.0 03/03/2021    BILITOT 2.2 03/03/2021    ALKPHOS 112 03/03/2021     03/03/2021     03/03/2021     Creatinine to 2.3 from 1.8  T bili was 3.7 at outside hospital  INR now up to 2.04 from 1.59 last night    Assessment and plan:  67year old female with ascending cholangitis  With treatment for ascending cholangitis being biliary decompression, and ERCP here unsuccessful, the patient needs emergent transfer to a facility with IR capabilities to do intrahepatic drain. Cholecystostomy tube placement might relieve some pressure from the biliary tree, and cholecystectomy would be of no help at all. Will discuss with hospitalist service. Continue with IV fluid resuscitation, antibiotics, and pressors. Discussed with nursing to continue vaso and levo. If unable to transfer and have no other choice then laparoscopic cholecystostomy tube would be last ditch effort. Although, in discussions with anesthesia at the time of this consult, they recommend resuscitation over night to see if she will improve enough to tolerate general anesthesia.     Qamar Lopez MD  3/3/2021  7:40 PM

## 2021-03-04 NOTE — PROGRESS NOTES
Results for Frosty Goldmann (MRN [id number]) as of 3/3/2021 18:18   Ref.  Range 3/3/2021 18:12   Hemoglobin, Art, Extended Latest Ref Range: 12.0 - 16.0 g/dL 10.1 (L)   pH, Arterial Latest Ref Range: 7.350 - 7.450  7.160 (LL)   pCO2, Arterial Latest Ref Range: 35.0 - 45.0 mmHg 68.0 (H)   pO2, Arterial Latest Ref Range: 80.0 - 100.0 mmHg 241.0 (H)   HCO3, Arterial Latest Ref Range: 22.0 - 26.0 mmol/L 24.2   Base Excess, Arterial Latest Ref Range: -2.0 - 2.0 mmol/L -5.1 (L)   O2 Sat, Arterial Latest Ref Range: >92 % 96.1   O2 Content, Arterial Latest Ref Range: Not Established mL/dL 14.2   Methemoglobin, Arterial Latest Ref Range: <1.5 % 0.6   Carboxyhgb, Arterial Latest Ref Range: 0.0 - 5.0 % 0.4     VC 12 400 + 5 100%  Site A-Line  AT NA  Increased RR to 16 and VT to 500; decreased FIO2 to 50%

## 2021-03-04 NOTE — PROGRESS NOTES
Nora Collier with [facility] states that [facility] will not be following this patient for organ donation but requests call at cardiac time of death.        Electronically signed by Rom Lau RN on 3/4/2021 at 12:30 AM

## 2021-03-04 NOTE — PROGRESS NOTES
Notified patient's son, Nicanor Estrada, on patient's current status. Son stated he would not want chest compressions if patient's heart was to stop. Witnessed by Darlin Titus RN. Will notify Dr. Ricky Howard.        Electronically signed by Naty Ayon RN on 3/3/2021 at 11:25 PM    Electronically signed by Darlin Titus RN on 3/3/2021 at 11:26 PM

## 2021-03-04 NOTE — PROGRESS NOTES
HOANG notified of patient GCS of 5. Spoke with Dolly Skaggs. HOANG to review and call back. Requests that care not be withdrawn before HOANG calls back.     Electronically signed by Matthias Caballero RN on 3/4/2021 at 12:17 AM

## 2021-03-04 NOTE — DISCHARGE SUMMARY
for Dhiraj Reddy (MRN [id number]) as of 3/4/2021 00:30   Ref. Range 3/2/2021 18:09 3/2/2021 18:20 3/3/2021 03:52 3/3/2021 14:51 3/3/2021 18:12 3/3/2021 19:04 3/3/2021 19:05 3/3/2021 20:06   Sodium Latest Ref Range: 136 - 145 mmol/L 130 (L)  136    137    Potassium Latest Ref Range: 3.5 - 5.0 mmol/L 3.7  3.8    4.5    Chloride Latest Ref Range: 98 - 111 mmol/L 97 (L)  103    101    CO2 Latest Ref Range: 22 - 29 mmol/L 16 (L)  19 (L)    22    BUN Latest Ref Range: 8 - 23 mg/dL 34 (H)  37 (H)    45 (H)    Creatinine Latest Ref Range: 0.5 - 0.9 mg/dL 1.4 (H)  1.8 (H)    2.3 (H)    Anion Gap Latest Ref Range: 7 - 19 mmol/L 17  14    14    GFR Non- Latest Ref Range: >60  36 (A)  27 (A)    21 (A)    GFR  Latest Ref Range: >59  44 (L)  33 (L)    25 (L)    Lactic Acid Latest Ref Range: 0.5 - 1.9 mmol/L 3.5 (HH)  2.2 (HH)     2.2 (HH)   Glucose Latest Ref Range: 74 - 109 mg/dL 73 (L)  78    68 (L)    Calcium Latest Ref Range: 8.8 - 10.2 mg/dL 8.9  7.3 (L)    8.0 (L)    Total Protein Latest Ref Range: 6.6 - 8.7 g/dL 5.2 (L)  4.8 (L)    5.0 (L)    Potassium, Whole Blood Unknown  3.5   4.3      Pro-BNP Latest Ref Range: 0 - 1,800 pg/mL 35,000 (H)          Troponin Latest Ref Range: 0.00 - 0.03 ng/mL       0.42 (HH)    Albumin Latest Ref Range: 3.5 - 5.2 g/dL 3.0 (L)  2.8 (L)    2.8 (L)    Alk Phos Latest Ref Range: 35 - 104 U/L 186 (H)  124 (H)    112 (H)    ALT Latest Ref Range: 5 - 33 U/L 488 (H)  418 (H)    360 (H)    AST Latest Ref Range: 5 - 32 U/L 571 (H)  413 (H)    279 (H)    Bilirubin Latest Ref Range: 0.2 - 1.2 mg/dL 3.0 (H)  2.4 (H)    2.2 (H)    Lipase Latest Ref Range: 13 - 60 U/L    255 (H)       Vancomycin Rm Latest Units: ug/mL      10.0     Results for Dhiraj Reddy (MRN [id number]) as of 3/4/2021 00:30   Ref.  Range 3/2/2021 18:09 3/3/2021 03:52 3/3/2021 14:51 3/3/2021 19:05   WBC Latest Ref Range: 4.8 - 10.8 K/uL 6.4 10.9 (H)  13.7 (H)   RBC Latest Ref Range: 4.20 - 5.40 M/uL 2.95 (L) 2.63 (L)  2.93 (L)   Hemoglobin Quant Latest Ref Range: 12.0 - 16.0 g/dL 9.9 (L) 9.0 (L)  10.0 (L)   Hematocrit Latest Ref Range: 37.0 - 47.0 % 31.8 (L) 27.7 (L)  30.8 (L)   MCV Latest Ref Range: 81.0 - 99.0 fL 107.8 (H) 105.3 (H)  105.1 (H)   MCH Latest Ref Range: 27.0 - 31.0 pg 33.6 (H) 34.2 (H)  34.1 (H)   MCHC Latest Ref Range: 33.0 - 37.0 g/dL 31.1 (L) 32.5 (L)  32.5 (L)   MPV Latest Ref Range: 9.4 - 12.3 fL 9.6 10.3  11.0   RDW Latest Ref Range: 11.5 - 14.5 % 14.2 14.5  14.6 (H)   Platelet Count Latest Ref Range: 130 - 400 K/uL 117 (L) 92 (L)  93 (L)   Neutrophils % Latest Ref Range: 50.0 - 65.0 %  75.0 (H)  66.0 (H)   Lymphocyte % Latest Ref Range: 20.0 - 40.0 %  5.0 (L)  4.0 (L)   Monocytes % Latest Ref Range: 0.0 - 10.0 %  0.0  1.0   Eosinophils % Latest Ref Range: 0.0 - 5.0 %  0.0  0.0   Basophils % Latest Ref Range: 0.0 - 1.0 %  0.0  0.0   Neutrophils Absolute Latest Ref Range: 1.5 - 7.5 K/uL  10.4 (H)  12.9 (H)   Immature Granulocytes # Latest Units: K/uL  0.2  2.0   Lymphocytes Absolute Latest Ref Range: 1.1 - 4.5 K/uL  0.5 (L)  0.7 (L)   Monocytes Absolute Latest Ref Range: 0.00 - 0.90 K/uL  0.00  0.10   Eosinophils Absolute Latest Ref Range: 0.00 - 0.60 K/uL  0.00  0.00   Basophils Absolute Latest Ref Range: 0.00 - 0.20 K/uL  0.00  0.00   Bands Relative Latest Ref Range: 0 - 5 %  20 (H)  15 (H)   Myelocyte Percent Latest Units: %    7 (A)   Atypical Lymphocytes Relative Latest Ref Range: 0 - 8 %    1   Vacuolated Neutrophils Unknown  1+ (A)     PLATELET SLIDE REVIEW Unknown  Decreased  Decreased   Macrocytes Unknown  1+ (A)     Metamyelocytes Relative Latest Units: %    6 (A)   Folate Latest Ref Range: 4.8 - 37.3 ng/mL   17.6    Vitamin B-12 Latest Ref Range: 211 - 946 pg/mL   1815 (H)    Results for Vicki Santos (MRN [id number]) as of 3/4/2021 00:30   Ref.  Range 3/2/2021 21:17 3/3/2021 19:05   Prothrombin Time Latest Ref Range: 12.0 - 14.6 sec 19.1 (H) 23.4 (H)   INR Latest Ref Range: 0.88 - 1.18 1.59 (H) 2.04 (H)   Results for Catherine Rock (MRN [id number]) as of 3/4/2021 00:30   Ref. Range 3/2/2021 18:09 3/3/2021 09:10 3/3/2021 13:40   CULTURE, BLOOD 1 Unknown Rpt (A) Rpt    CULTURE, BLOOD 2 Unknown Rpt (A)     Culture, Blood 2 Unknown Gram stain Aerobi. .. (A)     SARS-CoV-2, NAAT Latest Ref Range: Not Detected    Not Detected   Gram stain Aerobic bottle:   Gram stain Anaerobic bottle:   Gram negative rods   Culture in progress   Results for Catherine Rock (MRN [id number]) as of 3/4/2021 00:30   Ref.  Range 3/2/2021 23:23 3/3/2021 14:34   Color, UA Latest Ref Range: Straw/Yellow  DARK YELLOW (A)    Clarity, UA Latest Ref Range: Clear  CLOUDY (A)    Glucose, UA Latest Ref Range: Negative mg/dL Negative    Bilirubin, Urine Latest Ref Range: Negative  MODERATE (A)    Ketones, Urine Latest Ref Range: Negative mg/dL TRACE (A)    Specific [place], UA Latest Ref Range: 1.005 - 1.030  1.019    Blood, Urine Latest Ref Range: Negative  Negative    pH, UA Latest Ref Range: 5.0 - 8.0  6.5    Protein, UA Latest Ref Range: Negative mg/dL 100 (A)    Protein, Ur Latest Ref Range: 15 - 45 mg/dL 132 (H)    Urobilinogen, Urine Latest Ref Range: <2.0 E.U./dL 4.0 (A)    Nitrite, Urine Latest Ref Range: Negative  Negative    Leukocyte Esterase, Urine Latest Ref Range: Negative  TRACE (A)    Eosinophil, Ur Unknown  Insuf   WBC, UA Latest Ref Range: 0 - 5 /HPF 3-5 (A)    RBC, UA Latest Ref Range: 0 - 2 /HPF 2-4    Epithelial Cells, UA Latest Units: /HPF 0-2    Bacteria, UA Latest Ref Range: None Seen /HPF 1+ (A)    URINE RT REFLEX TO CULTURE Unknown Rpt (A)    Creatinine, Ur Latest Ref Range: 4.2 - 622.0 mg/dL 86.3    Sodium, Ur Latest Units: mmol/L 59.0    Urea Nitrogen, Ur Latest Units: mg/dL 584      Outstanding Order Results     Date and Time Order Name Status Description    3/3/2021 2046 Venous Blood Gas Preliminary     3/3/2021 1815 FLUORO FOR SURGICAL PROCEDURES In process     3/3/2021 0910 Culture, Blood 1 In process 3/3/2021 0830 Venous Blood Gas Preliminary     3/3/2021 0413 Venous Blood Gas Preliminary     3/2/2021 2329 Venous Blood Gas Preliminary     3/2/2021 1809 Venous Blood Gas Preliminary     3/2/2021 1809 Culture, Blood 2 Preliminary     3/2/2021 1809 Culture, Blood 1 Preliminary         Treatments: IVF, IV ABx, Pressor Support, CVC placement, Intubation with mechanical ventilation, ERCP      /67   Pulse 93   Temp 96.6 °F (35.9 °C) (Temporal)   Resp 16   Ht 5' (1.524 m)   Wt 120 lb (54.4 kg)   SpO2 93%   BMI 23.44 kg/m²     Discharge Exam:  Physical Exam  Vitals signs reviewed. Constitutional:       General: She is not in acute distress. Appearance: Normal appearance. She is normal weight. She is not ill-appearing or toxic-appearing. HENT:      Head: Normocephalic and atraumatic. Nose: No congestion or rhinorrhea. Eyes:      General:         Right eye: No discharge. Left eye: No discharge. Neck:      Musculoskeletal: Neck supple. Comments: Trachea appears midline  Cardiovascular:      Rate and Rhythm: Normal rate and regular rhythm. Heart sounds: No murmur. No friction rub. No gallop. Pulmonary:      Effort: No respiratory distress. Breath sounds: No stridor. No wheezing, rhonchi or rales. Comments: Breathing on vent  Chest:      Chest wall: No tenderness. Abdominal:      General: Bowel sounds are normal.      Palpations: Abdomen is soft. Tenderness: There is no abdominal tenderness. There is no guarding or rebound. Skin:     Comments: Warm but has a bear hugger, has severe mottling of skin over distal RLE and entire LLE   Neurological:      Mental Status: She is alert. Motor: No tremor or seizure activity.    Psychiatric:      Comments: Unable to assess as per general medical condition       Disposition: [facility] ICU as they have Interventional Radiology whereas we do not    Current Facility-Administered Medications   Medication Dose Route Frequency Provider Last Rate Last Admin    nicotine (NICODERM CQ) 21 MG/24HR 1 patch  1 patch Transdermal Daily Mikal Hutchinson MD   1 patch at 03/03/21 1573    lactated ringers infusion   Intravenous Continuous Mikal Hutchinson MD   Stopped at 03/03/21 2010    indomethacin (INDOCIN) 50 MG suppository 100 mg  100 mg Rectal Once Mikal Hutchinson MD        indomethacin (INDOCIN) 50 MG suppository    PRN Mikal Hutchinson MD   100 mg at 03/03/21 1628    vasopressin 20 Units in dextrose 5 % 100 mL infusion  0.03 Units/min Intravenous Continuous Denise Márquez MD 9 mL/hr at 03/03/21 2019 0.03 Units/min at 03/03/21 2019    0.9 % sodium chloride infusion   Intravenous Continuous Denise Márquez  mL/hr at 03/03/21 2009 New Bag at 03/03/21 2009    norepinephrine (LEVOPHED) 16 mg in sodium chloride 0.9 % 250 mL infusion  2-100 mcg/min Intravenous Continuous Mikal Hutchinson MD 18.8 mL/hr at 03/04/21 0032 20 mcg/min at 03/04/21 0032    sodium chloride flush 0.9 % injection 10 mL  10 mL Intravenous 2 times per day Mikal Hutchinson MD   10 mL at 03/03/21 2011    sodium chloride flush 0.9 % injection 10 mL  10 mL Intravenous PRN Mikal Hutchinson MD        promethazine (PHENERGAN) tablet 12.5 mg  12.5 mg Oral Q6H PRN Mikal Hutchinson MD        Or    [facility]) injection 4 mg  4 mg Intravenous Q6H PRN Mikal Hutchinson MD        acetaminophen (TYLENOL) tablet 650 mg  650 mg Oral Q6H PRN Mikal Hutchinson MD        Or    acetaminophen (TYLENOL) suppository 650 mg  650 mg Rectal Q6H PRN Mikal Hutchinson MD        potassium chloride (KLOR-CON M) extended release tablet 40 mEq  40 mEq Oral PRN Mikal Hutchinson MD        Or    potassium bicarb-citric acid (EFFER-K) effervescent tablet 40 mEq  40 mEq Oral PRN Mikal Hutchinson MD        Or    potassium chloride 10 mEq/100 mL IVPB (Peripheral Line)  10 mEq Intravenous PRN Mikal Hutchinson MD        magnesium sulfate 2000 mg in 50 mL IVPB premix  2,000 mg Intravenous PRN Mikal Hutchinson MD  polyethylene glycol (GLYCOLAX) packet 17 g  17 g Oral Daily PRN Reymundo Cordero MD        [facility]) tablet 8.6 mg  1 tablet Oral Daily Reymundo Cordero MD   8.6 mg at 03/03/21 3844    [Held by provider] clopidogrel (PLAVIX) tablet 75 mg  75 mg Oral Daily Milo Ann PA-C   75 mg at 03/03/21 0057    meropenem (MERREM) 1,000 mg in sterile water 20 mL IV syringe  1,000 mg Intravenous Q12H Reymundo Cordero MD   1,000 mg at 03/03/21 2014    [Held by provider] enoxaparin (LOVENOX) injection 30 mg  30 mg Subcutaneous Q24H Reymundo Cordero MD   30 mg at 03/02/21 2311    vancomycin (VANCOCIN) intermittent dosing (placeholder)   Other RX Placeholder Reymundo Cordero MD         Facility-Administered Medications Ordered in Other Encounters   Medication Dose Route Frequency Provider Last Rate Last Admin    propofol injection    PRN Taisha Taylor, APRN - CRNA   80 mg at 03/03/21 1602    norepinephrine (LEVOPHED) 16 mg in dextrose 5% 250 mL infusion    Continuous PRN Taisha Joanat, APRN - CRNA 46.9 mL/hr at 03/03/21 1550 50 mcg/min at 03/03/21 1550    lidocaine 2 % injection    PRN Taisha Joanat, APRN - CRNA   40 mg at 03/03/21 1602    succinylcholine (ANECTINE) injection    PRN Taisha Joanat, APRN - CRNA   80 mg at 03/03/21 1603    rocuronium (ZEMURON) injection    PRN Taisha Joanat, APRN - CRNA   30 mg at 03/03/21 1641    lactated ringers infusion    Continuous PRN Taisha Joanat, APRN - CRNA   New Bag at 03/03/21 1550    indomethacin (INDOCIN) 50 MG suppository             calcium chloride 10 % injection    PRN Taisha Joanat, APRN - CRNA   1 g at 03/03/21 1611    glucagon (rDNA) injection    PRN Taisha Joanat, APRN - CRNA   1 mg at 03/03/21 1700    FentaNYL Waste 100 mcg, Midazolam Waste 2 mg    Continuous PRN Taisha Joanat, APRN - CRNA   New Bag at 03/03/21 1718    phenylephrine (KATHERINE-SYNEPHRINE) 20 mg in sodium chloride 0.9 % 250 mL infusion    Continuous PRN Taisha Taylor, APRN - CRNA   500 mcg at 03/03/21 1618    phenylephrine (KATHERINE-SYNEPHRINE) injection    PRN Dolores Yu, APRN - CRNA   100 mcg at 03/03/21 1620       Anticipated Home Discharge Medications List:  CONTINUE these medications which have NOT CHANGED  Medication Dose   omeprazole (PRILOSEC) 20 MG delayed release capsule 20 mg   Take 20 mg by mouth daily       carvedilol (COREG) 6.25 MG tablet 6.25 mg   Take 6.25 mg by mouth 2 times daily (with meals)       furosemide (LASIX) 20 MG tablet 20 mg   Take 20 mg by mouth 2 times daily       isosorbide mononitrate (IMDUR) 30 MG extended release tablet 30 mg   Take 30 mg by mouth daily       atorvastatin (LIPITOR) 20 MG tablet 20 mg   Take 20 mg by mouth daily       metoprolol (LOPRESSOR) 100 MG tablet 100 mg   Take 100 mg by mouth 2 times daily       cetirizine (ZYRTEC) 10 MG tablet 10 mg   Take 10 mg by mouth daily       olmesartan (BENICAR) 20 MG tablet 20 mg   Take 20 mg by mouth daily       aspirin 81 MG tablet 81 mg   Take 81 mg by mouth daily. clopidogrel (PLAVIX) 75 MG tablet 75 mg   Take 75 mg by mouth daily. STOP taking these previous medications  Medication Dose Reason for Stopping Comments   (STOP TAKING) celecoxib (CELEBREX) 200 MG capsule 200 mg           (STOP TAKING) metoprolol (TOPROL-XL) 100 MG XL tablet 100 mg           (STOP TAKING) ezetimibe (ZETIA) 10 MG tablet 10 mg           (STOP TAKING) rosuvastatin (CRESTOR) 10 MG tablet 5 mg           Patient Instructions: Activity: bedrest for now, disposition upon ICU discharge to be determined  Diet: NPO for now, cardiac diet anticipated upon eventual discharge home  Wound Care: needs will be determined by the discharging facility    Follow-up with PCP within 1-2 weeks anticipated.     Signed:  Oliver Mccormakc  3/4/2021  3:06 AM     Care Time Spent >75 minutes & then later time spent additionally speaking with the HCP at the bedside  Time for Arrange Discharge, seeing patient multiple times, communicating information, and in document preperation

## 2021-03-04 NOTE — PROGRESS NOTES
Received a call from Dr. Michael Lea in regards to patient's clinical update in the endoscopy suite. Patient was taken down for ERCP given hospitalization for choledocholithiasis. Unfortunately procedure unsuccessful as unable to cannulate the biliary tree. Patient also became unstable from cardiac standpoint developing SVT. Dr. Michael Lea recommend surgical evaluation for possible percutaneous tube placement for emergent biliary drainage. General surgery consultation was placed. Postprocedure, patient was unable to get extubated, she was vented and sent back to the CCU. Patient now requiring 2 pressors which includes Froy-Synephrine as well as Levophed. Patient's heart rates in the 150s, EKG was obtained which showed sinus tachycardia most likely secondary to ongoing sepsis. Patient was given 1 L of fluids in the OR, cardiology consultation will be placed. In the CCU unable to obtain oxygen saturation given patient being really cool to touch in both extremities. Discussed with CCU team to obtain a bear hugger in order to increase body temp and obtain oxygen saturation if able. Patient's current status was discussed with son who is currently present in the CCU, clinical status currently guarded. We will also update nocturnist to keep a close eye on patient. And will transition care to ICU team to  in the morning.

## 2021-03-04 NOTE — PROGRESS NOTES
Pharmacy Vancomycin Consult     Vancomycin Day: 2  Current Dosing: vancomycin pulse dosing    Temp max:  98.2    Recent Labs     03/03/21  0352 03/03/21  1905   BUN 37* 45*       Recent Labs     03/03/21  0352 03/03/21  1905   CREATININE 1.8* 2.3*       Recent Labs     03/03/21  0352 03/03/21  1905   WBC 10.9* 13.7*         Intake/Output Summary (Last 24 hours) at 3/3/2021 2204  Last data filed at 3/3/2021 2000  Gross per 24 hour   Intake 2245.94 ml   Output 463 ml   Net 1782.94 ml       Culture Date Source Results   03/02/21 Blood x 2  Gram negative rods   03/03/21 Blood  Sent             Ht Readings from Last 1 Encounters:   03/02/21 5' (1.524 m)        Wt Readings from Last 1 Encounters:   03/02/21 120 lb (54.4 kg)         Body mass index is 23.44 kg/m². Estimated Creatinine Clearance: 15 mL/min (A) (based on SCr of 2.3 mg/dL (H)). Random level: 10.0 (22 hour level)    Assessment/Plan: Continue Vancomycin pulse dosing. Give Vancomycin 1000 mg IV x 1 dose today. Random level ordered.     Electronically signed by SHAILESH Kaur [facility] on 3/3/2021 at 10:04 PM

## 2021-03-04 NOTE — PROGRESS NOTES
Discussed options with patient's son along with Dale Cordoba, ELENO. Son decided to decline transfer to OSH. Hospice consulted. Will continue to monitor.     Electronically signed by Jessi Castellano RN on 3/4/2021 at 4:06 AM     Electronically signed by Dale Cordoba RN on 3/4/2021 at 4:07 AM

## 2021-03-04 NOTE — PROGRESS NOTES
Speech Language Pathology  Facility/Department: [facility] CORONARY CARE UNIT    NAME: Charlotte Arroyo  : 1945  MRN: [id number]    Per progress note documentation, patient with decline in medical status and is currently intubated. Patient family has decided upon hospice services. Speech therapy to sign-off at this time.     Electronically siged by MARLEN Mckeon on 3/4/2021 at 9:13 AM

## 2021-03-04 NOTE — PROGRESS NOTES
[place] [id number]. adm signed by Tyson Yip. It is ok to dc the pt, call [phone] to give report then transfer the pt to the [place] [id number].. Emotional and sc support provided.

## 2021-03-04 NOTE — PROGRESS NOTES
Hospice consult placed on this pt. Call from Caio Lockwood and Dr. Jack Lance requesting SBAR. Pt is sedated on vent. No pulses in lower ext per her RN, Alyson Srinivasan. Observed lower ext \"dark purple\" in color from knees down. Pt has been on Levophed gtt for BP support. Fentanyl gtt infusing. Scopolamine patch, Atropine drops ordered. Son is at bedside. Emotional support provided.    Electronically signed by Aurora Morton RN on 3/4/2021 at 8:39 AM

## 2021-03-05 LAB
BLOOD CULTURE, ROUTINE: ABNORMAL
BLOOD CULTURE, ROUTINE: ABNORMAL
CULTURE, BLOOD 2: ABNORMAL
CULTURE, BLOOD 2: ABNORMAL
ORGANISM: ABNORMAL
ORGANISM: ABNORMAL

## 2021-03-07 LAB
BLOOD CULTURE, ROUTINE: ABNORMAL
ORGANISM: ABNORMAL

## 2021-03-09 LAB
EKG P AXIS: 60 DEGREES
EKG P AXIS: NORMAL DEGREES
EKG P-R INTERVAL: 148 MS
EKG P-R INTERVAL: NORMAL MS
EKG Q-T INTERVAL: 298 MS
EKG Q-T INTERVAL: 328 MS
EKG QRS DURATION: 66 MS
EKG QRS DURATION: 74 MS
EKG QTC CALCULATION (BAZETT): 466 MS
EKG QTC CALCULATION (BAZETT): 469 MS
EKG T AXIS: 107 DEGREES
EKG T AXIS: 85 DEGREES

## 2021-03-10 NOTE — ANESTHESIA POSTPROCEDURE EVALUATION
Department of Anesthesiology  Postprocedure Note    Patient: Siva Soliman  MRN: [id number]  YOB: 1945  Date of evaluation: 3/10/2021  Time:  9:38 AM     Procedure Summary     Date: 03/03/21 Room / Location: [address]    Anesthesia Start: [facility] Anesthesia Stop:     Procedure: ERCP DIAGNOSTIC (N/A Abdomen) Diagnosis: (Choledocholithiasis)    Surgeons: Alka Kerns MD Responsible Provider: Jeronimo Cardenas MD    Anesthesia Type: general ASA Status: 4 - Emergent          Anesthesia Type: No value filed. Ashvin Phase I: Ashvin Score: 9    Ashvin Phase II:      Last vitals: Reviewed and per EMR flowsheets. Anesthesia Post Evaluation    Patient location during evaluation: ICU  Patient participation: complete - patient cannot participate  Level of consciousness: sedated and ventilated  Pain score: 0  Airway patency: patent  Nausea & Vomiting: no nausea and no vomiting  Complications: yes (Intraoperative EKG changes, hemodynamic instability)  Cardiovascular status: hemodynamically stable  Respiratory status: acceptable and ventilator  Hydration status: euvolemic  Comments: Patient became increasingly hemodynamically unstable throughout the course of the case. Patient requiring pressors to maintain adequate BP. I was called to the room after EKG changes were noted. Discussed the situation with the Endoscopist, and we agreed that the procedure should be terminated at this time. Patient was transported intubated and sedated, with pressors.

## (undated) DEVICE — ANESTHESIA CIRCUIT ADULT-LF: Brand: MEDLINE INDUSTRIES, INC.

## (undated) DEVICE — GUIDEWIRE ENDOSCP ANGLED 0.025 INX450 CM STD RND JAGWIRE

## (undated) DEVICE — SPHINCTEROTOME: Brand: DREAMTOME™ RX 44

## (undated) DEVICE — CONTRAST IOTHALAMATE MEGLUMINE 60% 50 ML INJ CONRAY 60

## (undated) DEVICE — SYSTEM BX CAP BILI RAP EXCHG CAP LOK DEV COMPATIBLE W/ OLY

## (undated) DEVICE — ENDO KIT,LOURDES HOSPITAL: Brand: MEDLINE INDUSTRIES, INC.